# Patient Record
Sex: FEMALE | Race: OTHER | NOT HISPANIC OR LATINO | Employment: FULL TIME | URBAN - METROPOLITAN AREA
[De-identification: names, ages, dates, MRNs, and addresses within clinical notes are randomized per-mention and may not be internally consistent; named-entity substitution may affect disease eponyms.]

---

## 2023-02-22 LAB
ALANINE AMINOTRANSFERASE (SGPT) (U/L) IN SER/PLAS: 28 U/L (ref 7–45)
ALBUMIN (G/DL) IN SER/PLAS: 4.6 G/DL (ref 3.4–5)
ALKALINE PHOSPHATASE (U/L) IN SER/PLAS: 61 U/L (ref 33–110)
ANION GAP IN SER/PLAS: 15 MMOL/L (ref 10–20)
ASPARTATE AMINOTRANSFERASE (SGOT) (U/L) IN SER/PLAS: 22 U/L (ref 9–39)
BASOPHILS (10*3/UL) IN BLOOD BY AUTOMATED COUNT: 0.03 X10E9/L (ref 0–0.1)
BASOPHILS/100 LEUKOCYTES IN BLOOD BY AUTOMATED COUNT: 0.5 % (ref 0–2)
BILIRUBIN TOTAL (MG/DL) IN SER/PLAS: 0.5 MG/DL (ref 0–1.2)
CALCIUM (MG/DL) IN SER/PLAS: 9.9 MG/DL (ref 8.6–10.6)
CARBON DIOXIDE, TOTAL (MMOL/L) IN SER/PLAS: 26 MMOL/L (ref 21–32)
CHLORIDE (MMOL/L) IN SER/PLAS: 103 MMOL/L (ref 98–107)
CHOLESTEROL (MG/DL) IN SER/PLAS: 230 MG/DL (ref 0–199)
CHOLESTEROL IN HDL (MG/DL) IN SER/PLAS: 96.7 MG/DL
CHOLESTEROL/HDL RATIO: 2.4
CREATININE (MG/DL) IN SER/PLAS: 0.69 MG/DL (ref 0.5–1.05)
EOSINOPHILS (10*3/UL) IN BLOOD BY AUTOMATED COUNT: 0.07 X10E9/L (ref 0–0.7)
EOSINOPHILS/100 LEUKOCYTES IN BLOOD BY AUTOMATED COUNT: 1.2 % (ref 0–6)
ERYTHROCYTE DISTRIBUTION WIDTH (RATIO) BY AUTOMATED COUNT: 13.7 % (ref 11.5–14.5)
ERYTHROCYTE MEAN CORPUSCULAR HEMOGLOBIN CONCENTRATION (G/DL) BY AUTOMATED: 31.6 G/DL (ref 32–36)
ERYTHROCYTE MEAN CORPUSCULAR VOLUME (FL) BY AUTOMATED COUNT: 96 FL (ref 80–100)
ERYTHROCYTES (10*6/UL) IN BLOOD BY AUTOMATED COUNT: 4.12 X10E12/L (ref 4–5.2)
GFR FEMALE: >90 ML/MIN/1.73M2
GLUCOSE (MG/DL) IN SER/PLAS: 86 MG/DL (ref 74–99)
HEMATOCRIT (%) IN BLOOD BY AUTOMATED COUNT: 39.5 % (ref 36–46)
HEMOGLOBIN (G/DL) IN BLOOD: 12.5 G/DL (ref 12–16)
IMMATURE GRANULOCYTES/100 LEUKOCYTES IN BLOOD BY AUTOMATED COUNT: 0.2 % (ref 0–0.9)
LDL: 122 MG/DL (ref 0–99)
LEUKOCYTES (10*3/UL) IN BLOOD BY AUTOMATED COUNT: 5.7 X10E9/L (ref 4.4–11.3)
LYMPHOCYTES (10*3/UL) IN BLOOD BY AUTOMATED COUNT: 1.53 X10E9/L (ref 1.2–4.8)
LYMPHOCYTES/100 LEUKOCYTES IN BLOOD BY AUTOMATED COUNT: 27 % (ref 13–44)
MONOCYTES (10*3/UL) IN BLOOD BY AUTOMATED COUNT: 0.24 X10E9/L (ref 0.1–1)
MONOCYTES/100 LEUKOCYTES IN BLOOD BY AUTOMATED COUNT: 4.2 % (ref 2–10)
NEUTROPHILS (10*3/UL) IN BLOOD BY AUTOMATED COUNT: 3.78 X10E9/L (ref 1.2–7.7)
NEUTROPHILS/100 LEUKOCYTES IN BLOOD BY AUTOMATED COUNT: 66.9 % (ref 40–80)
NRBC (PER 100 WBCS) BY AUTOMATED COUNT: 0 /100 WBC (ref 0–0)
PLATELETS (10*3/UL) IN BLOOD AUTOMATED COUNT: 274 X10E9/L (ref 150–450)
POTASSIUM (MMOL/L) IN SER/PLAS: 4.1 MMOL/L (ref 3.5–5.3)
PROTEIN TOTAL: 6.8 G/DL (ref 6.4–8.2)
SODIUM (MMOL/L) IN SER/PLAS: 140 MMOL/L (ref 136–145)
TRIGLYCERIDE (MG/DL) IN SER/PLAS: 58 MG/DL (ref 0–149)
UREA NITROGEN (MG/DL) IN SER/PLAS: 18 MG/DL (ref 6–23)
VLDL: 12 MG/DL (ref 0–40)

## 2023-10-07 ENCOUNTER — ANCILLARY PROCEDURE (OUTPATIENT)
Dept: RADIOLOGY | Facility: CLINIC | Age: 53
End: 2023-10-07
Payer: COMMERCIAL

## 2023-10-07 DIAGNOSIS — R74.8 ABNORMAL LEVELS OF OTHER SERUM ENZYMES: ICD-10-CM

## 2023-10-07 DIAGNOSIS — Z79.631 LONG TERM (CURRENT) USE OF ANTIMETABOLITE AGENT: ICD-10-CM

## 2023-10-07 DIAGNOSIS — Z51.81 ENCOUNTER FOR THERAPEUTIC DRUG LEVEL MONITORING: ICD-10-CM

## 2023-10-07 PROCEDURE — 76705 ECHO EXAM OF ABDOMEN: CPT | Performed by: RADIOLOGY

## 2023-10-07 PROCEDURE — 76705 ECHO EXAM OF ABDOMEN: CPT

## 2023-10-31 ENCOUNTER — LAB (OUTPATIENT)
Dept: LAB | Facility: LAB | Age: 53
End: 2023-10-31
Payer: COMMERCIAL

## 2023-10-31 DIAGNOSIS — R94.5 ABNORMAL RESULTS OF LIVER FUNCTION STUDIES: Primary | ICD-10-CM

## 2023-10-31 PROCEDURE — 86038 ANTINUCLEAR ANTIBODIES: CPT

## 2023-10-31 PROCEDURE — 86015 ACTIN ANTIBODY EACH: CPT

## 2023-10-31 PROCEDURE — 36415 COLL VENOUS BLD VENIPUNCTURE: CPT

## 2023-11-01 LAB
ANA SER QL HEP2 SUBST: NEGATIVE
SMOOTH MUSCLE AB SER QL IF: NEGATIVE

## 2024-02-01 DIAGNOSIS — I10 ESSENTIAL (PRIMARY) HYPERTENSION: ICD-10-CM

## 2024-02-06 RX ORDER — HYDROCHLOROTHIAZIDE 12.5 MG/1
12.5 TABLET ORAL
Qty: 90 TABLET | Refills: 0 | Status: SHIPPED | OUTPATIENT
Start: 2024-02-06 | End: 2024-03-20 | Stop reason: SINTOL

## 2024-03-10 DIAGNOSIS — I10 ESSENTIAL (PRIMARY) HYPERTENSION: ICD-10-CM

## 2024-03-11 RX ORDER — AMLODIPINE BESYLATE 5 MG/1
5 TABLET ORAL DAILY
Qty: 90 TABLET | Refills: 0 | Status: SHIPPED | OUTPATIENT
Start: 2024-03-11 | End: 2024-03-20 | Stop reason: WASHOUT

## 2024-03-11 NOTE — TELEPHONE ENCOUNTER
Rx request received  Pharmacy populated  Last appt 6/21/23  Called patient and scheduled for follow up appmt

## 2024-03-13 DIAGNOSIS — R07.9 CHEST PAIN, UNSPECIFIED: Primary | ICD-10-CM

## 2024-03-20 ENCOUNTER — OFFICE VISIT (OUTPATIENT)
Dept: PRIMARY CARE | Facility: CLINIC | Age: 54
End: 2024-03-20
Payer: COMMERCIAL

## 2024-03-20 VITALS
SYSTOLIC BLOOD PRESSURE: 130 MMHG | BODY MASS INDEX: 35.52 KG/M2 | OXYGEN SATURATION: 99 % | DIASTOLIC BLOOD PRESSURE: 78 MMHG | TEMPERATURE: 96 F | HEIGHT: 66 IN | WEIGHT: 221 LBS | RESPIRATION RATE: 18 BRPM | HEART RATE: 87 BPM

## 2024-03-20 DIAGNOSIS — Z12.31 ENCOUNTER FOR SCREENING MAMMOGRAM FOR MALIGNANT NEOPLASM OF BREAST: ICD-10-CM

## 2024-03-20 DIAGNOSIS — R35.0 URINARY FREQUENCY: ICD-10-CM

## 2024-03-20 DIAGNOSIS — E55.9 VITAMIN D DEFICIENCY: ICD-10-CM

## 2024-03-20 DIAGNOSIS — Z00.00 ANNUAL PHYSICAL EXAM: Primary | ICD-10-CM

## 2024-03-20 DIAGNOSIS — I10 PRIMARY HYPERTENSION: ICD-10-CM

## 2024-03-20 PROBLEM — E78.5 HYPERLIPIDEMIA: Status: ACTIVE | Noted: 2024-03-20

## 2024-03-20 PROBLEM — K21.9 GASTROESOPHAGEAL REFLUX DISEASE WITHOUT ESOPHAGITIS: Status: ACTIVE | Noted: 2018-06-18

## 2024-03-20 PROBLEM — E27.8 LEFT ADRENAL MASS (MULTI): Status: ACTIVE | Noted: 2024-03-20

## 2024-03-20 PROBLEM — M33.13 DERMATOMYOSITIS (MULTI): Status: ACTIVE | Noted: 2024-03-20

## 2024-03-20 PROBLEM — L40.9 PSORIASIS: Status: ACTIVE | Noted: 2023-03-04

## 2024-03-20 PROBLEM — L90.0 LICHEN SCLEROSUS: Status: ACTIVE | Noted: 2024-03-20

## 2024-03-20 PROBLEM — R73.01 IMPAIRED FASTING GLUCOSE: Status: ACTIVE | Noted: 2017-02-16

## 2024-03-20 PROBLEM — F41.8 DEPRESSION WITH ANXIETY: Status: ACTIVE | Noted: 2024-03-20

## 2024-03-20 PROCEDURE — 3078F DIAST BP <80 MM HG: CPT | Performed by: FAMILY MEDICINE

## 2024-03-20 PROCEDURE — 99396 PREV VISIT EST AGE 40-64: CPT | Performed by: FAMILY MEDICINE

## 2024-03-20 PROCEDURE — 3075F SYST BP GE 130 - 139MM HG: CPT | Performed by: FAMILY MEDICINE

## 2024-03-20 PROCEDURE — 1036F TOBACCO NON-USER: CPT | Performed by: FAMILY MEDICINE

## 2024-03-20 PROCEDURE — 99213 OFFICE O/P EST LOW 20 MIN: CPT | Performed by: FAMILY MEDICINE

## 2024-03-20 RX ORDER — METHOTREXATE 2.5 MG/1
2.5 TABLET ORAL
COMMUNITY

## 2024-03-20 RX ORDER — TRIAMCINOLONE ACETONIDE 1 MG/G
CREAM TOPICAL
Status: ON HOLD | COMMUNITY
End: 2024-03-27 | Stop reason: WASHOUT

## 2024-03-20 RX ORDER — FOLIC ACID 1 MG/1
1 TABLET ORAL DAILY
COMMUNITY

## 2024-03-20 RX ORDER — ALBUTEROL SULFATE 90 UG/1
2 AEROSOL, METERED RESPIRATORY (INHALATION) EVERY 4 HOURS PRN
COMMUNITY

## 2024-03-20 RX ORDER — LORAZEPAM 0.5 MG/1
0.5 TABLET ORAL
COMMUNITY
Start: 2013-06-04

## 2024-03-20 RX ORDER — METFORMIN HYDROCHLORIDE 500 MG/1
500 TABLET, EXTENDED RELEASE ORAL
COMMUNITY
Start: 2020-03-13

## 2024-03-20 RX ORDER — CLOBETASOL PROPIONATE 0.5 MG/G
1 OINTMENT TOPICAL 2 TIMES DAILY
COMMUNITY
Start: 2017-02-15

## 2024-03-20 RX ORDER — DEXMETHYLPHENIDATE HYDROCHLORIDE 15 MG/1
15 CAPSULE, EXTENDED RELEASE ORAL DAILY
COMMUNITY
Start: 2024-02-21

## 2024-03-20 RX ORDER — ISOSORBIDE MONONITRATE 30 MG/1
30 TABLET, EXTENDED RELEASE ORAL DAILY
Status: ON HOLD | COMMUNITY
Start: 2024-03-13 | End: 2024-03-27 | Stop reason: ALTCHOICE

## 2024-03-20 RX ORDER — ARIPIPRAZOLE 5 MG/1
5 TABLET ORAL DAILY
COMMUNITY

## 2024-03-20 RX ORDER — AMLODIPINE AND VALSARTAN 5; 160 MG/1; MG/1
1 TABLET ORAL DAILY
Qty: 30 TABLET | Refills: 11 | Status: SHIPPED | OUTPATIENT
Start: 2024-03-20 | End: 2025-03-20

## 2024-03-20 RX ORDER — ESCITALOPRAM OXALATE 20 MG/1
20 TABLET ORAL DAILY
COMMUNITY

## 2024-03-20 RX ORDER — ASCORBIC ACID 500 MG
500 TABLET ORAL
COMMUNITY
Start: 2011-12-07

## 2024-03-20 RX ORDER — TRIAMCINOLONE ACETONIDE 0.25 MG/G
1 CREAM TOPICAL
Status: ON HOLD | COMMUNITY
End: 2024-03-27 | Stop reason: WASHOUT

## 2024-03-20 RX ORDER — HYDROCORTISONE 25 MG/G
CREAM TOPICAL 2 TIMES DAILY
COMMUNITY
Start: 2018-06-26

## 2024-03-20 RX ORDER — MULTIVITAMIN
1 TABLET ORAL DAILY
COMMUNITY
Start: 2005-05-04

## 2024-03-20 RX ORDER — IBUPROFEN 200 MG
200 TABLET ORAL EVERY 6 HOURS
COMMUNITY

## 2024-03-20 ASSESSMENT — LIFESTYLE VARIABLES
HOW MANY STANDARD DRINKS CONTAINING ALCOHOL DO YOU HAVE ON A TYPICAL DAY: 1 OR 2
SKIP TO QUESTIONS 9-10: 1
HOW OFTEN DO YOU HAVE A DRINK CONTAINING ALCOHOL: MONTHLY OR LESS
HOW OFTEN DO YOU HAVE SIX OR MORE DRINKS ON ONE OCCASION: NEVER
AUDIT-C TOTAL SCORE: 1

## 2024-03-20 ASSESSMENT — PATIENT HEALTH QUESTIONNAIRE - PHQ9
6. FEELING BAD ABOUT YOURSELF - OR THAT YOU ARE A FAILURE OR HAVE LET YOURSELF OR YOUR FAMILY DOWN: NOT AT ALL
4. FEELING TIRED OR HAVING LITTLE ENERGY: MORE THAN HALF THE DAYS
8. MOVING OR SPEAKING SO SLOWLY THAT OTHER PEOPLE COULD HAVE NOTICED. OR THE OPPOSITE, BEING SO FIGETY OR RESTLESS THAT YOU HAVE BEEN MOVING AROUND A LOT MORE THAN USUAL: NOT AT ALL
2. FEELING DOWN, DEPRESSED OR HOPELESS: NEARLY EVERY DAY
3. TROUBLE FALLING OR STAYING ASLEEP OR SLEEPING TOO MUCH: SEVERAL DAYS
9. THOUGHTS THAT YOU WOULD BE BETTER OFF DEAD, OR OF HURTING YOURSELF: NOT AT ALL
10. IF YOU CHECKED OFF ANY PROBLEMS, HOW DIFFICULT HAVE THESE PROBLEMS MADE IT FOR YOU TO DO YOUR WORK, TAKE CARE OF THINGS AT HOME, OR GET ALONG WITH OTHER PEOPLE: NOT DIFFICULT AT ALL
7. TROUBLE CONCENTRATING ON THINGS, SUCH AS READING THE NEWSPAPER OR WATCHING TELEVISION: NEARLY EVERY DAY
5. POOR APPETITE OR OVEREATING: NEARLY EVERY DAY
SUM OF ALL RESPONSES TO PHQ9 QUESTIONS 1 AND 2: 6
SUM OF ALL RESPONSES TO PHQ QUESTIONS 1-9: 15
1. LITTLE INTEREST OR PLEASURE IN DOING THINGS: NEARLY EVERY DAY

## 2024-03-20 ASSESSMENT — PAIN SCALES - GENERAL: PAINLEVEL: 6

## 2024-03-20 NOTE — PROGRESS NOTES
History Of Present Illness  Vidya Landry is a 53 y.o. female presenting for MED FOLLOW UP APPMT (MED FOLLOW UP)  .    HPI     Here for routine physical.  She is up-to-date with her mammogram until May 2024.  Pap bup-to-date until 2027.  Also seeing dermatology once a year.she sees Dr. Ballard for GI and is up-to-date with colon cancer screening.    Seeing [facility]. Scheduled for stress test on Monday.     HTN - not checking at home. At work blood pressure is 130s over 80s.  Asymptomatic with amlodipine and hydrochlorothiazide.      She complains of urinary frequency and urgency somewhat so that she intends to see urogynecology.  She drinks about 2-3 caffeinated beverages a day.    Past Medical History  Patient Active Problem List    Diagnosis Date Noted    Alcohol abuse 03/26/2024    Atherosclerosis of coronary artery 03/26/2024    Bursitis of hip 03/26/2024    Disorder of connective tissue (CMS/HCC) 03/26/2024    History of colonic polyps 03/26/2024    Irregular menstrual cycle 03/26/2024    Long term methotrexate user 03/26/2024    Moderate recurrent major depression (CMS/HCC) 03/26/2024    Premenstrual tension syndrome 03/26/2024    Psoriasis with arthropathy (CMS/HCC) 03/26/2024    Rash 03/26/2024    Recurrent major depressive disorder, in remission (CMS/HCC) 03/26/2024    Mixed anxiety depressive disorder 03/26/2024    Hypertrophy of bone, unspecified shoulder 03/26/2024    Lichen sclerosus et atrophicus 03/26/2024    Long term current use of immunosuppressive drug 03/26/2024    Muscle spasm of back 03/26/2024    Dermatomyositis (CMS/HCC) 03/20/2024    MARK (generalized anxiety disorder) 03/20/2024    Mass of left adrenal gland (CMS/HCC) 03/20/2024    Lichen sclerosus 03/20/2024    Long term (current) use of antimetabolite agent 10/07/2023    Abnormal levels of other serum enzymes 10/07/2023    Encounter for therapeutic drug level monitoring 10/07/2023    Other specified noninfective disorders of lymphatic  vessels and lymph nodes 05/03/2023    Psoriasis 03/04/2023    Intrinsic (allergic) eczema 03/04/2023    Other long term (current) drug therapy 03/04/2023    Localized enlarged lymph nodes 02/16/2023    Gastroesophageal reflux disease without esophagitis 06/18/2018    Primary hypertension 06/18/2018    Daytime sleepiness 04/02/2018    Fatigue 04/02/2018    Pre-diabetes 04/02/2018    Impaired fasting glucose 02/16/2017    Presbyopia of both eyes 05/11/2016    Hyperopia of both eyes with astigmatism 05/11/2016    Obesity 11/19/2015    Depressive disorder 06/04/2013    Obstructive sleep apnea (adult) (pediatric) 05/23/2011    Parasomnia in conditions classified elsewhere 08/22/2008    Seborrheic dermatitis 08/05/2008    Hyperlipidemia 02/03/2006    Polycystic ovaries 11/08/2002    Allergic rhinitis 09/03/2002        Medications  Current Outpatient Medications on File Prior to Visit   Medication Sig    albuterol 90 mcg/actuation inhaler Inhale 2 puffs every 4 hours if needed for wheezing or shortness of breath.    amLODIPine (Norvasc) 5 mg tablet TAKE 1 TABLET BY MOUTH EVERY DAY FOR 90 DAYS    ARIPiprazole (Abilify) 5 mg tablet Take 1 tablet (5 mg) by mouth once daily.    ascorbic acid (Vitamin C) 500 mg tablet Take 1 tablet (500 mg) by mouth.    clobetasol (Temovate) 0.05 % ointment Apply 1 Application topically 2 times a day.    dexmethylphenidate XR (Focalin XR) 15 mg 24 hr capsule Take 1 capsule (15 mg) by mouth once daily.    escitalopram (Lexapro) 20 mg tablet Take 1 tablet (20 mg) by mouth once daily.    folic acid (Folvite) 1 mg tablet Take 1 tablet (1 mg) by mouth once daily.    hydroCHLOROthiazide (HYDRODiuril) 12.5 mg tablet TAKE 1 TABLET BY MOUTH EVERY DAY IN THE MORNING    hydrocortisone 2.5 % cream Apply topically 2 times a day.    ibuprofen 200 mg tablet Take 1 tablet (200 mg) by mouth every 6 hours.    isosorbide mononitrate ER (Imdur) 30 mg 24 hr tablet Take 1 tablet (30 mg) by mouth once daily.     LORazepam (Ativan) 0.5 mg tablet Take 1 tablet (0.5 mg) by mouth.    metFORMIN  mg 24 hr tablet Take 1 tablet (500 mg) by mouth once daily in the evening. Take with meals.    methotrexate (Trexall) 2.5 mg tablet Take 1 tablet (2.5 mg total) by mouth 1 (one) time per week.    multivitamin tablet Take 1 tablet by mouth once daily.    triamcinolone (Kenalog) 0.025 % cream 1 Application.    triamcinolone (Kenalog) 0.1 % cream 1 application to affected area Externally Twice a day as needed for active rash on body, not face, use sporadically contains steroid for 30 days     No current facility-administered medications on file prior to visit.        Surgical History  She has a past surgical history that includes Other surgical history (01/02/2020).     Social History  She reports that she quit smoking about 7 years ago. Her smoking use included cigarettes. She started smoking about 30 years ago. She has a 23.00 pack-year smoking history. She has never been exposed to tobacco smoke. She has never used smokeless tobacco. She reports current alcohol use. She reports current drug use. Drug: Marijuana.    Family History  Family History   Problem Relation Name Age of Onset    Hypertension Mother      Diabetes Mother      Psoriasis Mother      Diabetes Father      Kidney failure Father          Allergies  Amoxicillin-pot clavulanate, Balsam peru, Cobalt, and Dermatitis antigens    Immunizations  Immunization History   Administered Date(s) Administered    Flu vaccine (IIV4), preservative free *Check age/dose* 09/28/2016, 09/20/2017, 09/12/2018, 09/05/2020, 09/11/2021    Flu vaccine, quadrivalent, no egg protein, age 6 month or greater (FLUCELVAX) 09/14/2019, 09/22/2023    Flu vaccine, quadrivalent, recombinant, preservative free, adult (FLUBLOK) 08/26/2022    Hepatitis A vaccine, age 19 years and greater (HAVRIX) 06/15/2016, 12/16/2016    Hepatitis B vaccine, adult (RECOMBIVAX, ENGERIX) 06/15/2016, 07/13/2016, 10/12/2016     "Influenza, Unspecified 11/08/2002, 10/21/2003, 10/26/2011    Influenza, injectable, quadrivalent 09/12/2018, 10/01/2019, 10/12/2020, 09/11/2021    Influenza, seasonal, injectable 11/16/2000, 11/09/2001, 10/01/2015, 10/13/2016, 10/23/2018, 10/09/2019, 10/13/2023    Influenza, seasonal, injectable, preservative free 10/01/2015    Meningococcal ACWY vaccine (MENVEO) 07/13/2016    Novel influenza-H1N1-09, preservative-free 12/05/2009    Pfizer COVID-19 vaccine, Fall 2023, 12 years and older, (30mcg/0.3mL) 09/22/2023    Pfizer COVID-19 vaccine, bivalent, age 12 years and older (30 mcg/0.3 mL) 09/16/2022, 04/22/2023    Pfizer Purple Cap SARS-CoV-2 03/26/2021, 04/23/2021, 08/14/2021, 02/12/2022    Pneumococcal polysaccharide vaccine, 23-valent, age 2 years and older (PNEUMOVAX 23) 09/14/2019    Tdap vaccine, age 7 year and older (BOOSTRIX, ADACEL) 03/01/2010, 06/02/2020    Typhoid, ViCPs 12/16/2016    Zoster vaccine, recombinant, adult (SHINGRIX) 07/31/2021, 10/02/2021        ROS  Negative, except as discussed in HPI     Vitals  /78   Pulse 87   Temp 35.6 °C (96 °F)   Resp 18   Ht 1.676 m (5' 6\")   Wt 100 kg (221 lb)   SpO2 99%   BMI 35.67 kg/m²      Physical Exam  Vitals and nursing note reviewed.   Constitutional:       Appearance: Normal appearance.   HENT:      Head: Normocephalic.      Right Ear: Tympanic membrane normal.      Left Ear: Tympanic membrane normal.      Nose: Nose normal.      Mouth/Throat:      Mouth: Mucous membranes are moist.   Eyes:      Extraocular Movements: Extraocular movements intact.      Conjunctiva/sclera: Conjunctivae normal.      Pupils: Pupils are equal, round, and reactive to light.   Cardiovascular:      Rate and Rhythm: Normal rate and regular rhythm.      Heart sounds: Normal heart sounds.   Pulmonary:      Effort: Pulmonary effort is normal. No respiratory distress.      Breath sounds: Normal breath sounds.   Abdominal:      General: Abdomen is flat.      Palpations: " Abdomen is soft.      Tenderness: There is no abdominal tenderness.   Musculoskeletal:      Cervical back: Neck supple.   Lymphadenopathy:      Cervical: No cervical adenopathy.   Skin:     General: Skin is warm and dry.      Findings: No rash.   Neurological:      General: No focal deficit present.      Mental Status: She is alert. Mental status is at baseline.      Coordination: Coordination normal.      Gait: Gait normal.      Deep Tendon Reflexes: Reflexes normal.   Psychiatric:         Mood and Affect: Mood normal.         Behavior: Behavior normal.         Relevant Results  Lab Results   Component Value Date    WBC 8.7 03/25/2024    WBC 6.6 03/04/2023    HGB 13.1 03/25/2024    HGB 13.4 03/04/2023    HCT 39.9 03/25/2024    HCT 39.5 03/04/2023    MCV 93 03/25/2024    MCV 94.0 03/04/2023     03/25/2024     03/04/2023     Lab Results   Component Value Date     03/25/2024     03/04/2023    K 4.5 03/25/2024    K 4.5 03/04/2023     03/25/2024    CL 99 03/04/2023    CO2 25 03/25/2024    CO2 25 03/04/2023    BUN 12 03/25/2024    BUN 20 03/04/2023    CREATININE 0.70 03/25/2024    CREATININE 0.7 03/04/2023    CALCIUM 10.1 03/25/2024    CALCIUM 10.3 03/04/2023    PROT 6.8 03/25/2024    PROT 7.5 03/04/2023    BILITOT 0.3 03/25/2024    BILITOT 0.2 03/04/2023    ALKPHOS 87 03/25/2024    ALKPHOS 74 03/04/2023    ALT 20 03/25/2024    ALT 38 03/04/2023    AST 19 03/25/2024    AST 25 03/04/2023    GLUCOSE 93 03/25/2024    GLUCOSE 95 03/04/2023     Lab Results   Component Value Date    HGBA1C 5.2 03/25/2024     Lab Results   Component Value Date    TSH 1.92 03/25/2024    FREET4 1.5 01/03/2022      Lab Results   Component Value Date    CHOL 226 (H) 03/25/2024    TRIG 149 03/25/2024    HDL 83.0 03/25/2024           Assessment/Plan   Vidya was seen today for med follow up appmt.  Diagnoses and all orders for this visit:  Annual physical exam (Primary)  -     Comprehensive Metabolic Panel; Future  -      TSH with reflex to Free T4 if abnormal; Future  -     CBC; Future  -     Hemoglobin A1C; Future  Primary hypertension  Comments:  At goal but will discontinue hydrochlorothiazide due to urinary frequency  Orders:  -     amlodipine-valsartan (Exforge) 5-160 mg tablet; Take 1 tablet by mouth once daily.  -     Follow Up In Primary Care - Established; Future  Vitamin D deficiency  -     Vitamin D 25-Hydroxy,Total (for eval of Vitamin D levels); Future  Encounter for screening mammogram for malignant neoplasm of breast  -     BI mammo bilateral screening tomosynthesis; Future  Urinary frequency  Comments:  Discussed reducing bladder irritants including carbonated drinks, bladder exercises.  If no improvement consider medication if she no appt with urogyn by then       Medications Discontinued During This Encounter   Medication Reason    hydroCHLOROthiazide (HYDRODiuril) 12.5 mg tablet Side effects    amLODIPine (Norvasc) 5 mg tablet Med List Cleanup        Counseling:   Medication education:   -Education:  The patient is counseled regarding potential side-effects of any and all new medications  -Understanding:  Patient expressed understanding of information discussed today  -Adherence:  No barriers to adherence identified    Final treatment plan is a result of shared decision making with patient.         Cale Chi MD

## 2024-03-20 NOTE — PATIENT INSTRUCTIONS
Cut back on caffeine/carbonated drinks  We changed amlodipine and hydrochlorothiazide to a combination pill amlodipine/valsartan once daily

## 2024-03-25 ENCOUNTER — HOSPITAL ENCOUNTER (OUTPATIENT)
Dept: RADIOLOGY | Facility: HOSPITAL | Age: 54
Discharge: HOME | DRG: 322 | End: 2024-03-25
Payer: COMMERCIAL

## 2024-03-25 ENCOUNTER — LAB (OUTPATIENT)
Dept: LAB | Facility: LAB | Age: 54
End: 2024-03-25
Payer: COMMERCIAL

## 2024-03-25 ENCOUNTER — HOSPITAL ENCOUNTER (OUTPATIENT)
Dept: CARDIOLOGY | Facility: HOSPITAL | Age: 54
Discharge: HOME | DRG: 322 | End: 2024-03-25
Payer: COMMERCIAL

## 2024-03-25 DIAGNOSIS — R07.9 CHEST PAIN, UNSPECIFIED: ICD-10-CM

## 2024-03-25 DIAGNOSIS — E55.9 VITAMIN D DEFICIENCY: ICD-10-CM

## 2024-03-25 DIAGNOSIS — Z00.00 ANNUAL PHYSICAL EXAM: ICD-10-CM

## 2024-03-25 LAB
25(OH)D3 SERPL-MCNC: 78 NG/ML (ref 31–100)
ALBUMIN SERPL-MCNC: 4.5 G/DL (ref 3.5–5)
ALP BLD-CCNC: 87 U/L (ref 35–125)
ALT SERPL-CCNC: 20 U/L (ref 5–40)
ANION GAP SERPL CALC-SCNC: 14 MMOL/L
AST SERPL-CCNC: 19 U/L (ref 5–40)
BILIRUB SERPL-MCNC: 0.3 MG/DL (ref 0.1–1.2)
BUN SERPL-MCNC: 12 MG/DL (ref 8–25)
CALCIUM SERPL-MCNC: 10.1 MG/DL (ref 8.5–10.4)
CHLORIDE SERPL-SCNC: 103 MMOL/L (ref 97–107)
CHOLEST SERPL-MCNC: 226 MG/DL (ref 133–200)
CHOLEST/HDLC SERPL: 2.7 {RATIO}
CO2 SERPL-SCNC: 25 MMOL/L (ref 24–31)
CREAT SERPL-MCNC: 0.7 MG/DL (ref 0.4–1.6)
EGFRCR SERPLBLD CKD-EPI 2021: >90 ML/MIN/1.73M*2
ERYTHROCYTE [DISTWIDTH] IN BLOOD BY AUTOMATED COUNT: 13.5 % (ref 11.5–14.5)
EST. AVERAGE GLUCOSE BLD GHB EST-MCNC: 103 MG/DL
GLUCOSE SERPL-MCNC: 93 MG/DL (ref 65–99)
HBA1C MFR BLD: 5.2 %
HCT VFR BLD AUTO: 39.9 % (ref 36–46)
HDLC SERPL-MCNC: 83 MG/DL
HGB BLD-MCNC: 13.1 G/DL (ref 12–16)
LDLC SERPL CALC-MCNC: 113 MG/DL (ref 65–130)
MCH RBC QN AUTO: 30.6 PG (ref 26–34)
MCHC RBC AUTO-ENTMCNC: 32.8 G/DL (ref 32–36)
MCV RBC AUTO: 93 FL (ref 80–100)
NRBC BLD-RTO: 0 /100 WBCS (ref 0–0)
PLATELET # BLD AUTO: 267 X10*3/UL (ref 150–450)
POTASSIUM SERPL-SCNC: 4.5 MMOL/L (ref 3.4–5.1)
PROT SERPL-MCNC: 6.8 G/DL (ref 5.9–7.9)
RBC # BLD AUTO: 4.28 X10*6/UL (ref 4–5.2)
SODIUM SERPL-SCNC: 142 MMOL/L (ref 133–145)
TRIGL SERPL-MCNC: 149 MG/DL (ref 40–150)
TSH SERPL DL<=0.05 MIU/L-ACNC: 1.92 MIU/L (ref 0.27–4.2)
WBC # BLD AUTO: 8.7 X10*3/UL (ref 4.4–11.3)

## 2024-03-25 PROCEDURE — 82306 VITAMIN D 25 HYDROXY: CPT

## 2024-03-25 PROCEDURE — A9502 TC99M TETROFOSMIN: HCPCS | Performed by: INTERNAL MEDICINE

## 2024-03-25 PROCEDURE — 3430000001 HC RX 343 DIAGNOSTIC RADIOPHARMACEUTICALS: Performed by: INTERNAL MEDICINE

## 2024-03-25 PROCEDURE — 85027 COMPLETE CBC AUTOMATED: CPT

## 2024-03-25 PROCEDURE — 80061 LIPID PANEL: CPT

## 2024-03-25 PROCEDURE — 80053 COMPREHEN METABOLIC PANEL: CPT

## 2024-03-25 PROCEDURE — 93017 CV STRESS TEST TRACING ONLY: CPT

## 2024-03-25 PROCEDURE — 36415 COLL VENOUS BLD VENIPUNCTURE: CPT

## 2024-03-25 PROCEDURE — 78452 HT MUSCLE IMAGE SPECT MULT: CPT

## 2024-03-25 PROCEDURE — 78452 HT MUSCLE IMAGE SPECT MULT: CPT | Performed by: STUDENT IN AN ORGANIZED HEALTH CARE EDUCATION/TRAINING PROGRAM

## 2024-03-25 PROCEDURE — 83036 HEMOGLOBIN GLYCOSYLATED A1C: CPT

## 2024-03-25 PROCEDURE — 84443 ASSAY THYROID STIM HORMONE: CPT

## 2024-03-25 RX ADMIN — TETROFOSMIN 11.4 MILLICURIE: 0.23 INJECTION, POWDER, LYOPHILIZED, FOR SOLUTION INTRAVENOUS at 08:45

## 2024-03-25 RX ADMIN — TETROFOSMIN 36 MILLICURIE: 0.23 INJECTION, POWDER, LYOPHILIZED, FOR SOLUTION INTRAVENOUS at 10:35

## 2024-03-26 ENCOUNTER — OFFICE VISIT (OUTPATIENT)
Dept: SURGERY | Facility: CLINIC | Age: 54
End: 2024-03-26
Payer: COMMERCIAL

## 2024-03-26 VITALS
DIASTOLIC BLOOD PRESSURE: 76 MMHG | WEIGHT: 222 LBS | OXYGEN SATURATION: 98 % | SYSTOLIC BLOOD PRESSURE: 134 MMHG | BODY MASS INDEX: 35.68 KG/M2 | HEART RATE: 81 BPM | HEIGHT: 66 IN

## 2024-03-26 DIAGNOSIS — N63.21 MASS OF UPPER OUTER QUADRANT OF LEFT BREAST: Primary | ICD-10-CM

## 2024-03-26 PROBLEM — R74.8 ABNORMAL LEVELS OF OTHER SERUM ENZYMES: Status: ACTIVE | Noted: 2023-10-07

## 2024-03-26 PROBLEM — Z79.899 OTHER LONG TERM (CURRENT) DRUG THERAPY: Status: ACTIVE | Noted: 2023-03-04

## 2024-03-26 PROBLEM — Z79.631 LONG TERM METHOTREXATE USER: Status: ACTIVE | Noted: 2024-03-26

## 2024-03-26 PROBLEM — L40.50 PSORIASIS WITH ARTHROPATHY (MULTI): Status: ACTIVE | Noted: 2024-03-26

## 2024-03-26 PROBLEM — F33.1 MODERATE RECURRENT MAJOR DEPRESSION (MULTI): Status: ACTIVE | Noted: 2024-03-26

## 2024-03-26 PROBLEM — L20.84 INTRINSIC (ALLERGIC) ECZEMA: Status: ACTIVE | Noted: 2023-03-04

## 2024-03-26 PROBLEM — L90.0 LICHEN SCLEROSUS ET ATROPHICUS: Status: ACTIVE | Noted: 2024-03-26

## 2024-03-26 PROBLEM — F41.1 GAD (GENERALIZED ANXIETY DISORDER): Status: ACTIVE | Noted: 2024-03-20

## 2024-03-26 PROBLEM — M62.830 MUSCLE SPASM OF BACK: Status: ACTIVE | Noted: 2024-03-26

## 2024-03-26 PROBLEM — Z79.899 LONG TERM CURRENT USE OF IMMUNOSUPPRESSIVE DRUG: Status: ACTIVE | Noted: 2024-03-26

## 2024-03-26 PROBLEM — F33.40 RECURRENT MAJOR DEPRESSIVE DISORDER, IN REMISSION (CMS-HCC): Status: ACTIVE | Noted: 2024-03-26

## 2024-03-26 PROBLEM — N94.3 PREMENSTRUAL TENSION SYNDROME: Status: ACTIVE | Noted: 2024-03-26

## 2024-03-26 PROBLEM — M70.70 BURSITIS OF HIP: Status: ACTIVE | Noted: 2024-03-26

## 2024-03-26 PROBLEM — Z86.0100 HISTORY OF COLONIC POLYPS: Status: ACTIVE | Noted: 2024-03-26

## 2024-03-26 PROBLEM — F10.10 ALCOHOL ABUSE: Status: ACTIVE | Noted: 2024-03-26

## 2024-03-26 PROBLEM — M35.9 DISORDER OF CONNECTIVE TISSUE (MULTI): Status: ACTIVE | Noted: 2024-03-26

## 2024-03-26 PROBLEM — M89.319: Status: ACTIVE | Noted: 2024-03-26

## 2024-03-26 PROBLEM — R53.83 FATIGUE: Status: ACTIVE | Noted: 2018-04-02

## 2024-03-26 PROBLEM — N92.6 IRREGULAR MENSTRUAL CYCLE: Status: ACTIVE | Noted: 2024-03-26

## 2024-03-26 PROBLEM — R21 RASH: Status: ACTIVE | Noted: 2024-03-26

## 2024-03-26 PROBLEM — Z79.631 LONG TERM (CURRENT) USE OF ANTIMETABOLITE AGENT: Status: ACTIVE | Noted: 2023-10-07

## 2024-03-26 PROBLEM — R40.0 DAYTIME SLEEPINESS: Status: ACTIVE | Noted: 2018-04-02

## 2024-03-26 PROBLEM — R73.03 PRE-DIABETES: Status: ACTIVE | Noted: 2018-04-02

## 2024-03-26 PROBLEM — Z51.81 ENCOUNTER FOR THERAPEUTIC DRUG LEVEL MONITORING: Status: ACTIVE | Noted: 2023-10-07

## 2024-03-26 PROBLEM — I25.10 ATHEROSCLEROSIS OF CORONARY ARTERY: Status: ACTIVE | Noted: 2024-03-26

## 2024-03-26 PROBLEM — R59.0 LOCALIZED ENLARGED LYMPH NODES: Status: ACTIVE | Noted: 2023-02-16

## 2024-03-26 PROBLEM — I89.8 OTHER SPECIFIED NONINFECTIVE DISORDERS OF LYMPHATIC VESSELS AND LYMPH NODES: Status: ACTIVE | Noted: 2023-05-03

## 2024-03-26 PROBLEM — F41.8 MIXED ANXIETY DEPRESSIVE DISORDER: Status: ACTIVE | Noted: 2024-03-26

## 2024-03-26 PROBLEM — Z86.010 HISTORY OF COLONIC POLYPS: Status: ACTIVE | Noted: 2024-03-26

## 2024-03-26 PROCEDURE — 99204 OFFICE O/P NEW MOD 45 MIN: CPT | Performed by: STUDENT IN AN ORGANIZED HEALTH CARE EDUCATION/TRAINING PROGRAM

## 2024-03-26 PROCEDURE — 99214 OFFICE O/P EST MOD 30 MIN: CPT | Performed by: STUDENT IN AN ORGANIZED HEALTH CARE EDUCATION/TRAINING PROGRAM

## 2024-03-26 PROCEDURE — 3078F DIAST BP <80 MM HG: CPT | Performed by: STUDENT IN AN ORGANIZED HEALTH CARE EDUCATION/TRAINING PROGRAM

## 2024-03-26 PROCEDURE — 3075F SYST BP GE 130 - 139MM HG: CPT | Performed by: STUDENT IN AN ORGANIZED HEALTH CARE EDUCATION/TRAINING PROGRAM

## 2024-03-26 PROCEDURE — 1036F TOBACCO NON-USER: CPT | Performed by: STUDENT IN AN ORGANIZED HEALTH CARE EDUCATION/TRAINING PROGRAM

## 2024-03-26 RX ORDER — ASPIRIN 81 MG/1
81 TABLET ORAL DAILY
COMMUNITY

## 2024-03-26 ASSESSMENT — ENCOUNTER SYMPTOMS
SPEECH DIFFICULTY: 0
DYSURIA: 0
SHORTNESS OF BREATH: 0
HEADACHES: 0
CHILLS: 0
CHEST TIGHTNESS: 0
BLOOD IN STOOL: 0
HEMATURIA: 0
ADENOPATHY: 0
BRUISES/BLEEDS EASILY: 0
VOMITING: 0
FEVER: 0
SORE THROAT: 0
PALPITATIONS: 0
ARTHRALGIAS: 0
TROUBLE SWALLOWING: 0
WOUND: 0
VOICE CHANGE: 0
FACIAL ASYMMETRY: 0
ABDOMINAL PAIN: 0
UNEXPECTED WEIGHT CHANGE: 0
NAUSEA: 0
DIARRHEA: 0

## 2024-03-26 ASSESSMENT — PATIENT HEALTH QUESTIONNAIRE - PHQ9
2. FEELING DOWN, DEPRESSED OR HOPELESS: NOT AT ALL
SUM OF ALL RESPONSES TO PHQ9 QUESTIONS 1 AND 2: 0
1. LITTLE INTEREST OR PLEASURE IN DOING THINGS: NOT AT ALL

## 2024-03-26 ASSESSMENT — PAIN SCALES - GENERAL: PAINLEVEL: 0-NO PAIN

## 2024-03-26 NOTE — LETTER
March 26, 2024     Patient: Vidya Landry   YOB: 1970   Date of Visit: 3/26/2024       To Whom It May Concern:    Vidya Landry  was seen in my office today .  She will need to remain out of work until medically cleared.    If you have any questions or concerns, please don't hesitate to call [phone].         Sincerely,        Ericka Delaney MD    CC: No Recipients

## 2024-03-26 NOTE — PROGRESS NOTES
History Of Present Illness  Vidya Landry is a 53 y.o. female presenting for urgent evaluation of a left breast mass.  She was scheduled for a nuclear medicine stress test with Dr. Chou yesterday and was found to have a reversible perfusion defect and recommendation was for her to undergo a cardiac cath with likely stent placement.  This is planned for tomorrow.  On the CT imaging, she was noted to have a left breast mass and Dr. Chou referred her to me for evaluation before a stent was placed. she has gotten yearly mammograms for several years.  She does not do self breast exams and has not noticed any change in her breast over the past year.  No family history of breast or other Gyn cancer     Past Medical History  History reviewed. No pertinent past medical history.    Surgical History  Past Surgical History:   Procedure Laterality Date    OTHER SURGICAL HISTORY  01/02/2020    [place] tooth extraction        Social History  She reports that she quit smoking about 7 years ago. Her smoking use included cigarettes. She started smoking about 30 years ago. She has a 23.00 pack-year smoking history. She has never been exposed to tobacco smoke. She has never used smokeless tobacco. She reports current alcohol use. She reports current drug use. Drug: Marijuana.    Family History  Family History   Problem Relation Name Age of Onset    Hypertension Mother      Diabetes Mother      Psoriasis Mother      Diabetes Father      Kidney failure Father          Allergies  Amoxicillin-pot clavulanate, Balsam peru, Cobalt, and Dermatitis antigens    Review of Systems   Constitutional:  Negative for chills, fever and unexpected weight change.   HENT:  Negative for sneezing, sore throat, trouble swallowing and voice change.    Respiratory:  Negative for chest tightness and shortness of breath.    Cardiovascular:  Negative for chest pain and palpitations.   Gastrointestinal:  Negative for abdominal pain, blood in stool,  "diarrhea, nausea and vomiting.   Endocrine: Negative for cold intolerance and heat intolerance.   Genitourinary:  Negative for decreased urine volume, dysuria and hematuria.   Musculoskeletal:  Negative for arthralgias and gait problem.   Skin:  Negative for rash and wound.   Neurological:  Negative for facial asymmetry, speech difficulty and headaches.   Hematological:  Negative for adenopathy. Does not bruise/bleed easily.   Psychiatric/Behavioral:  Negative for self-injury and suicidal ideas.         Physical Exam  Vitals and nursing note reviewed. Exam conducted with a chaperone present.   Constitutional:       Appearance: Normal appearance.   HENT:      Head: Normocephalic and atraumatic.      Mouth/Throat:      Mouth: Mucous membranes are moist.      Pharynx: Oropharynx is clear.   Eyes:      Extraocular Movements: Extraocular movements intact.      Pupils: Pupils are equal, round, and reactive to light.   Cardiovascular:      Rate and Rhythm: Normal rate and regular rhythm.      Pulses: Normal pulses.   Pulmonary:      Effort: Pulmonary effort is normal.      Breath sounds: Normal breath sounds.   Chest:       Abdominal:      General: There is no distension.      Palpations: Abdomen is soft.      Tenderness: There is no abdominal tenderness.   Musculoskeletal:      Cervical back: Normal range of motion and neck supple.   Skin:     General: Skin is warm and dry.   Neurological:      General: No focal deficit present.      Mental Status: She is alert and oriented to person, place, and time.   Psychiatric:         Mood and Affect: Mood normal.         Behavior: Behavior normal.          Last Recorded Vitals  Blood pressure 134/76, pulse 81, height 1.676 m (5' 6\"), weight 101 kg (222 lb), SpO2 98 %.    Relevant Results   reviewed mammograms [id number] which are available in the system.  All demonstrate benign-appearing fibroglandular tissue with asymmetry in the left upper outer quadrant of the breast, which has " not significantly changed over many years   I have also reviewed the CT scan from the nuclear medicine test yesterday, which shows a left breast abnormality in the upper outer quadrant which likely correlates with the previous mammograms     Assessment/Plan   Problem List Items Addressed This Visit    None  Visit Diagnoses         Codes    Mass of upper outer quadrant of left breast    -  Primary N63.21    Relevant Orders    Referral to Breast Surgery           53-year-old female status post cardiac stress test yesterday which found reversible perfusion defects necessitating a heart catheterization and stent placement.  She was also found to have a left breast mass on the imaging, and this likely correlates with the previous finding seen on mammogram which had a benign appearance and have not significantly changed over the past 4 years.  No contraindication to proceeding with stent placement tomorrow.  The patient has her yearly mammogram scheduled for May, and I have referred her to breast surgery so they can follow these findings and perform any biopsies or surgical intervention in the future as necessary.      Ericka Delaney MD

## 2024-03-27 ENCOUNTER — APPOINTMENT (OUTPATIENT)
Dept: CARDIOLOGY | Facility: HOSPITAL | Age: 54
DRG: 322 | End: 2024-03-27
Payer: COMMERCIAL

## 2024-03-27 ENCOUNTER — HOSPITAL ENCOUNTER (OUTPATIENT)
Facility: HOSPITAL | Age: 54
Setting detail: OBSERVATION
LOS: 1 days | Discharge: HOME | DRG: 322 | End: 2024-03-28
Attending: INTERNAL MEDICINE | Admitting: INTERNAL MEDICINE
Payer: COMMERCIAL

## 2024-03-27 DIAGNOSIS — I10 PRIMARY HYPERTENSION: ICD-10-CM

## 2024-03-27 DIAGNOSIS — R94.39 ABNORMAL CARDIOVASCULAR STRESS TEST: Primary | ICD-10-CM

## 2024-03-27 DIAGNOSIS — I25.10 ATHEROSCLEROSIS OF NATIVE CORONARY ARTERY OF NATIVE HEART WITHOUT ANGINA PECTORIS: ICD-10-CM

## 2024-03-27 DIAGNOSIS — R07.9 CHEST PAIN: ICD-10-CM

## 2024-03-27 DIAGNOSIS — R94.30 ABNORMAL RESULT OF CARDIOVASCULAR FUNCTION STUDY, UNSPECIFIED: ICD-10-CM

## 2024-03-27 PROCEDURE — 99152 MOD SED SAME PHYS/QHP 5/>YRS: CPT | Performed by: INTERNAL MEDICINE

## 2024-03-27 PROCEDURE — 2550000001 HC RX 255 CONTRASTS: Performed by: INTERNAL MEDICINE

## 2024-03-27 PROCEDURE — C1725 CATH, TRANSLUMIN NON-LASER: HCPCS | Performed by: INTERNAL MEDICINE

## 2024-03-27 PROCEDURE — 2500000002 HC RX 250 W HCPCS SELF ADMINISTERED DRUGS (ALT 637 FOR MEDICARE OP, ALT 636 FOR OP/ED): Performed by: INTERNAL MEDICINE

## 2024-03-27 PROCEDURE — G0269 OCCLUSIVE DEVICE IN VEIN ART: HCPCS | Mod: TC,59 | Performed by: INTERNAL MEDICINE

## 2024-03-27 PROCEDURE — 2780000003 HC OR 278 NO HCPCS: Performed by: INTERNAL MEDICINE

## 2024-03-27 PROCEDURE — G0378 HOSPITAL OBSERVATION PER HR: HCPCS

## 2024-03-27 PROCEDURE — 85347 COAGULATION TIME ACTIVATED: CPT

## 2024-03-27 PROCEDURE — 2500000005 HC RX 250 GENERAL PHARMACY W/O HCPCS: Performed by: INTERNAL MEDICINE

## 2024-03-27 PROCEDURE — C1887 CATHETER, GUIDING: HCPCS | Performed by: INTERNAL MEDICINE

## 2024-03-27 PROCEDURE — 93005 ELECTROCARDIOGRAM TRACING: CPT | Mod: 59

## 2024-03-27 PROCEDURE — 99153 MOD SED SAME PHYS/QHP EA: CPT | Performed by: INTERNAL MEDICINE

## 2024-03-27 PROCEDURE — C1760 CLOSURE DEV, VASC: HCPCS | Performed by: INTERNAL MEDICINE

## 2024-03-27 PROCEDURE — C1874 STENT, COATED/COV W/DEL SYS: HCPCS | Performed by: INTERNAL MEDICINE

## 2024-03-27 PROCEDURE — 2500000001 HC RX 250 WO HCPCS SELF ADMINISTERED DRUGS (ALT 637 FOR MEDICARE OP): Performed by: INTERNAL MEDICINE

## 2024-03-27 PROCEDURE — 2060000001 HC INTERMEDIATE ICU ROOM DAILY

## 2024-03-27 PROCEDURE — 2720000007 HC OR 272 NO HCPCS: Performed by: INTERNAL MEDICINE

## 2024-03-27 PROCEDURE — 93010 ELECTROCARDIOGRAM REPORT: CPT | Performed by: INTERNAL MEDICINE

## 2024-03-27 PROCEDURE — C1769 GUIDE WIRE: HCPCS | Performed by: INTERNAL MEDICINE

## 2024-03-27 PROCEDURE — C9600 PERC DRUG-EL COR STENT SING: HCPCS | Performed by: INTERNAL MEDICINE

## 2024-03-27 PROCEDURE — 93458 L HRT ARTERY/VENTRICLE ANGIO: CPT | Mod: 59 | Performed by: INTERNAL MEDICINE

## 2024-03-27 PROCEDURE — 2500000004 HC RX 250 GENERAL PHARMACY W/ HCPCS (ALT 636 FOR OP/ED): Mod: JZ | Performed by: INTERNAL MEDICINE

## 2024-03-27 DEVICE — STENT ONYXNG40026UX ONYX 4.00X26RX
Type: IMPLANTABLE DEVICE | Site: HEART | Status: FUNCTIONAL
Brand: ONYX FRONTIER™

## 2024-03-27 RX ORDER — FENTANYL CITRATE 50 UG/ML
INJECTION, SOLUTION INTRAMUSCULAR; INTRAVENOUS AS NEEDED
Status: DISCONTINUED | OUTPATIENT
Start: 2024-03-27 | End: 2024-03-27 | Stop reason: HOSPADM

## 2024-03-27 RX ORDER — LORAZEPAM 0.5 MG/1
0.5 TABLET ORAL EVERY 6 HOURS PRN
Status: DISCONTINUED | OUTPATIENT
Start: 2024-03-27 | End: 2024-03-28 | Stop reason: HOSPADM

## 2024-03-27 RX ORDER — DEXTROSE MONOHYDRATE AND SODIUM CHLORIDE 5; .45 G/100ML; G/100ML
100 INJECTION, SOLUTION INTRAVENOUS CONTINUOUS
Status: DISCONTINUED | OUTPATIENT
Start: 2024-03-27 | End: 2024-03-28 | Stop reason: HOSPADM

## 2024-03-27 RX ORDER — MIDAZOLAM HYDROCHLORIDE 1 MG/ML
INJECTION, SOLUTION INTRAMUSCULAR; INTRAVENOUS AS NEEDED
Status: DISCONTINUED | OUTPATIENT
Start: 2024-03-27 | End: 2024-03-27 | Stop reason: HOSPADM

## 2024-03-27 RX ORDER — NITROGLYCERIN 40 MG/100ML
INJECTION INTRAVENOUS AS NEEDED
Status: DISCONTINUED | OUTPATIENT
Start: 2024-03-27 | End: 2024-03-27 | Stop reason: HOSPADM

## 2024-03-27 RX ORDER — VALSARTAN 80 MG/1
160 TABLET ORAL DAILY
Status: DISCONTINUED | OUTPATIENT
Start: 2024-03-28 | End: 2024-03-28 | Stop reason: HOSPADM

## 2024-03-27 RX ORDER — ALBUTEROL SULFATE 90 UG/1
2 AEROSOL, METERED RESPIRATORY (INHALATION) EVERY 4 HOURS PRN
Status: DISCONTINUED | OUTPATIENT
Start: 2024-03-27 | End: 2024-03-27 | Stop reason: CLARIF

## 2024-03-27 RX ORDER — HEPARIN SODIUM 1000 [USP'U]/ML
INJECTION, SOLUTION INTRAVENOUS; SUBCUTANEOUS AS NEEDED
Status: DISCONTINUED | OUTPATIENT
Start: 2024-03-27 | End: 2024-03-27 | Stop reason: HOSPADM

## 2024-03-27 RX ORDER — BISMUTH SUBSALICYLATE 262 MG
1 TABLET,CHEWABLE ORAL DAILY
Status: DISCONTINUED | OUTPATIENT
Start: 2024-03-28 | End: 2024-03-28 | Stop reason: HOSPADM

## 2024-03-27 RX ORDER — ACETAMINOPHEN 325 MG/1
650 TABLET ORAL EVERY 6 HOURS PRN
Status: DISCONTINUED | OUTPATIENT
Start: 2024-03-27 | End: 2024-03-28 | Stop reason: HOSPADM

## 2024-03-27 RX ORDER — NAPROXEN SODIUM 220 MG/1
81 TABLET, FILM COATED ORAL DAILY
Status: DISCONTINUED | OUTPATIENT
Start: 2024-03-27 | End: 2024-03-27

## 2024-03-27 RX ORDER — ATORVASTATIN CALCIUM 80 MG/1
80 TABLET, FILM COATED ORAL NIGHTLY
Status: DISCONTINUED | OUTPATIENT
Start: 2024-03-27 | End: 2024-03-28 | Stop reason: HOSPADM

## 2024-03-27 RX ORDER — SODIUM CHLORIDE 9 MG/ML
125 INJECTION, SOLUTION INTRAVENOUS CONTINUOUS
Status: ACTIVE | OUTPATIENT
Start: 2024-03-27 | End: 2024-03-27

## 2024-03-27 RX ORDER — ACETAMINOPHEN 650 MG/1
650 SUPPOSITORY RECTAL EVERY 6 HOURS PRN
Status: DISCONTINUED | OUTPATIENT
Start: 2024-03-27 | End: 2024-03-28 | Stop reason: HOSPADM

## 2024-03-27 RX ORDER — AMLODIPINE AND VALSARTAN 5; 160 MG/1; MG/1
1 TABLET ORAL DAILY
Status: DISCONTINUED | OUTPATIENT
Start: 2024-03-27 | End: 2024-03-27 | Stop reason: CLARIF

## 2024-03-27 RX ORDER — ASPIRIN 325 MG
TABLET ORAL AS NEEDED
Status: DISCONTINUED | OUTPATIENT
Start: 2024-03-27 | End: 2024-03-27 | Stop reason: HOSPADM

## 2024-03-27 RX ORDER — ASPIRIN 81 MG/1
81 TABLET ORAL DAILY
Status: DISCONTINUED | OUTPATIENT
Start: 2024-03-27 | End: 2024-03-28 | Stop reason: HOSPADM

## 2024-03-27 RX ORDER — METHOTREXATE 2.5 MG/1
2.5 TABLET ORAL
Status: DISCONTINUED | OUTPATIENT
Start: 2024-03-28 | End: 2024-03-28 | Stop reason: HOSPADM

## 2024-03-27 RX ORDER — NITROGLYCERIN 0.4 MG/1
0.4 TABLET SUBLINGUAL EVERY 5 MIN PRN
Status: DISCONTINUED | OUTPATIENT
Start: 2024-03-27 | End: 2024-03-28 | Stop reason: HOSPADM

## 2024-03-27 RX ORDER — ALBUTEROL SULFATE 0.83 MG/ML
2.5 SOLUTION RESPIRATORY (INHALATION) EVERY 4 HOURS PRN
Status: DISCONTINUED | OUTPATIENT
Start: 2024-03-27 | End: 2024-03-28 | Stop reason: HOSPADM

## 2024-03-27 RX ORDER — ACETAMINOPHEN 160 MG/5ML
650 SOLUTION ORAL EVERY 6 HOURS PRN
Status: DISCONTINUED | OUTPATIENT
Start: 2024-03-27 | End: 2024-03-28 | Stop reason: HOSPADM

## 2024-03-27 RX ORDER — HYDROCHLOROTHIAZIDE 25 MG/1
25 TABLET ORAL DAILY
Status: DISCONTINUED | OUTPATIENT
Start: 2024-03-27 | End: 2024-03-28 | Stop reason: HOSPADM

## 2024-03-27 RX ORDER — AMLODIPINE BESYLATE 5 MG/1
5 TABLET ORAL DAILY
Status: DISCONTINUED | OUTPATIENT
Start: 2024-03-28 | End: 2024-03-28 | Stop reason: HOSPADM

## 2024-03-27 RX ORDER — HYDROCHLOROTHIAZIDE 25 MG/1
25 TABLET ORAL DAILY
COMMUNITY

## 2024-03-27 RX ORDER — IODIXANOL 320 MG/ML
INJECTION, SOLUTION INTRAVASCULAR AS NEEDED
Status: DISCONTINUED | OUTPATIENT
Start: 2024-03-27 | End: 2024-03-27 | Stop reason: HOSPADM

## 2024-03-27 RX ORDER — ESCITALOPRAM OXALATE 20 MG/1
20 TABLET ORAL DAILY
Status: DISCONTINUED | OUTPATIENT
Start: 2024-03-27 | End: 2024-03-28 | Stop reason: HOSPADM

## 2024-03-27 RX ORDER — METOPROLOL SUCCINATE 50 MG/1
25 TABLET, EXTENDED RELEASE ORAL DAILY
Status: DISCONTINUED | OUTPATIENT
Start: 2024-03-27 | End: 2024-03-28 | Stop reason: HOSPADM

## 2024-03-27 RX ORDER — LIDOCAINE HYDROCHLORIDE 10 MG/ML
INJECTION, SOLUTION EPIDURAL; INFILTRATION; INTRACAUDAL; PERINEURAL AS NEEDED
Status: DISCONTINUED | OUTPATIENT
Start: 2024-03-27 | End: 2024-03-27 | Stop reason: HOSPADM

## 2024-03-27 RX ORDER — ARIPIPRAZOLE 5 MG/1
5 TABLET ORAL DAILY
Status: DISCONTINUED | OUTPATIENT
Start: 2024-03-27 | End: 2024-03-28 | Stop reason: HOSPADM

## 2024-03-27 RX ORDER — FOLIC ACID 1 MG/1
1 TABLET ORAL DAILY
Status: DISCONTINUED | OUTPATIENT
Start: 2024-03-27 | End: 2024-03-28 | Stop reason: HOSPADM

## 2024-03-27 RX ORDER — DEXMETHYLPHENIDATE HYDROCHLORIDE 15 MG/1
15 CAPSULE, EXTENDED RELEASE ORAL DAILY
Status: DISCONTINUED | OUTPATIENT
Start: 2024-03-27 | End: 2024-03-28 | Stop reason: HOSPADM

## 2024-03-27 RX ADMIN — METOPROLOL SUCCINATE 25 MG: 50 TABLET, FILM COATED, EXTENDED RELEASE ORAL at 17:30

## 2024-03-27 RX ADMIN — ATORVASTATIN CALCIUM 80 MG: 80 TABLET, FILM COATED ORAL at 20:23

## 2024-03-27 RX ADMIN — SODIUM CHLORIDE 125 ML/HR: 9 INJECTION, SOLUTION INTRAVENOUS at 17:30

## 2024-03-27 RX ADMIN — TICAGRELOR 90 MG: 90 TABLET ORAL at 20:23

## 2024-03-27 SDOH — ECONOMIC STABILITY: TRANSPORTATION INSECURITY
IN THE PAST 12 MONTHS, HAS LACK OF TRANSPORTATION KEPT YOU FROM MEETINGS, WORK, OR FROM GETTING THINGS NEEDED FOR DAILY LIVING?: NO

## 2024-03-27 SDOH — SOCIAL STABILITY: SOCIAL INSECURITY: WITHIN THE LAST YEAR, HAVE YOU BEEN AFRAID OF YOUR PARTNER OR EX-PARTNER?: NO

## 2024-03-27 SDOH — ECONOMIC STABILITY: HOUSING INSECURITY
IN THE LAST 12 MONTHS, WAS THERE A TIME WHEN YOU DID NOT HAVE A STEADY PLACE TO SLEEP OR SLEPT IN A SHELTER (INCLUDING NOW)?: NO

## 2024-03-27 SDOH — HEALTH STABILITY: PHYSICAL HEALTH: ON AVERAGE, HOW MANY DAYS PER WEEK DO YOU ENGAGE IN MODERATE TO STRENUOUS EXERCISE (LIKE A BRISK WALK)?: 7 DAYS

## 2024-03-27 SDOH — ECONOMIC STABILITY: FOOD INSECURITY: WITHIN THE PAST 12 MONTHS, THE FOOD YOU BOUGHT JUST DIDN'T LAST AND YOU DIDN'T HAVE MONEY TO GET MORE.: NEVER TRUE

## 2024-03-27 SDOH — ECONOMIC STABILITY: INCOME INSECURITY: HOW HARD IS IT FOR YOU TO PAY FOR THE VERY BASICS LIKE FOOD, HOUSING, MEDICAL CARE, AND HEATING?: NOT HARD AT ALL

## 2024-03-27 SDOH — SOCIAL STABILITY: SOCIAL INSECURITY
WITHIN THE LAST YEAR, HAVE TO BEEN RAPED OR FORCED TO HAVE ANY KIND OF SEXUAL ACTIVITY BY YOUR PARTNER OR EX-PARTNER?: NO

## 2024-03-27 SDOH — SOCIAL STABILITY: SOCIAL INSECURITY: WITHIN THE LAST YEAR, HAVE YOU BEEN HUMILIATED OR EMOTIONALLY ABUSED IN OTHER WAYS BY YOUR PARTNER OR EX-PARTNER?: NO

## 2024-03-27 SDOH — SOCIAL STABILITY: SOCIAL INSECURITY: ARE YOU OR HAVE YOU BEEN THREATENED OR ABUSED PHYSICALLY, EMOTIONALLY, OR SEXUALLY BY ANYONE?: NO

## 2024-03-27 SDOH — SOCIAL STABILITY: SOCIAL NETWORK: HOW OFTEN DO YOU ATTENT MEETINGS OF THE CLUB OR ORGANIZATION YOU BELONG TO?: NEVER

## 2024-03-27 SDOH — SOCIAL STABILITY: SOCIAL INSECURITY: HAVE YOU HAD THOUGHTS OF HARMING ANYONE ELSE?: NO

## 2024-03-27 SDOH — ECONOMIC STABILITY: INCOME INSECURITY: IN THE LAST 12 MONTHS, WAS THERE A TIME WHEN YOU WERE NOT ABLE TO PAY THE MORTGAGE OR RENT ON TIME?: NO

## 2024-03-27 SDOH — SOCIAL STABILITY: SOCIAL NETWORK: HOW OFTEN DO YOU GET TOGETHER WITH FRIENDS OR RELATIVES?: MORE THAN THREE TIMES A WEEK

## 2024-03-27 SDOH — SOCIAL STABILITY: SOCIAL INSECURITY
WITHIN THE LAST YEAR, HAVE YOU BEEN KICKED, HIT, SLAPPED, OR OTHERWISE PHYSICALLY HURT BY YOUR PARTNER OR EX-PARTNER?: NO

## 2024-03-27 SDOH — SOCIAL STABILITY: SOCIAL NETWORK
DO YOU BELONG TO ANY CLUBS OR ORGANIZATIONS SUCH AS CHURCH GROUPS UNIONS, FRATERNAL OR ATHLETIC GROUPS, OR SCHOOL GROUPS?: NO

## 2024-03-27 SDOH — ECONOMIC STABILITY: HOUSING INSECURITY: IN THE LAST 12 MONTHS, HOW MANY PLACES HAVE YOU LIVED?: 1

## 2024-03-27 SDOH — SOCIAL STABILITY: SOCIAL INSECURITY: ARE THERE ANY APPARENT SIGNS OF INJURIES/BEHAVIORS THAT COULD BE RELATED TO ABUSE/NEGLECT?: NO

## 2024-03-27 SDOH — ECONOMIC STABILITY: FOOD INSECURITY: WITHIN THE PAST 12 MONTHS, YOU WORRIED THAT YOUR FOOD WOULD RUN OUT BEFORE YOU GOT MONEY TO BUY MORE.: NEVER TRUE

## 2024-03-27 SDOH — HEALTH STABILITY: MENTAL HEALTH
STRESS IS WHEN SOMEONE FEELS TENSE, NERVOUS, ANXIOUS, OR CAN'T SLEEP AT NIGHT BECAUSE THEIR MIND IS TROUBLED. HOW STRESSED ARE YOU?: NOT AT ALL

## 2024-03-27 SDOH — SOCIAL STABILITY: SOCIAL INSECURITY: WERE YOU ABLE TO COMPLETE ALL THE BEHAVIORAL HEALTH SCREENINGS?: YES

## 2024-03-27 SDOH — ECONOMIC STABILITY: TRANSPORTATION INSECURITY
IN THE PAST 12 MONTHS, HAS THE LACK OF TRANSPORTATION KEPT YOU FROM MEDICAL APPOINTMENTS OR FROM GETTING MEDICATIONS?: NO

## 2024-03-27 SDOH — HEALTH STABILITY: PHYSICAL HEALTH: ON AVERAGE, HOW MANY MINUTES DO YOU ENGAGE IN EXERCISE AT THIS LEVEL?: 30 MIN

## 2024-03-27 SDOH — SOCIAL STABILITY: SOCIAL INSECURITY: DOES ANYONE TRY TO KEEP YOU FROM HAVING/CONTACTING OTHER FRIENDS OR DOING THINGS OUTSIDE YOUR HOME?: NO

## 2024-03-27 SDOH — SOCIAL STABILITY: SOCIAL INSECURITY: ABUSE: ADULT

## 2024-03-27 SDOH — SOCIAL STABILITY: SOCIAL INSECURITY: DO YOU FEEL ANYONE HAS EXPLOITED OR TAKEN ADVANTAGE OF YOU FINANCIALLY OR OF YOUR PERSONAL PROPERTY?: NO

## 2024-03-27 SDOH — SOCIAL STABILITY: SOCIAL NETWORK
IN A TYPICAL WEEK, HOW MANY TIMES DO YOU TALK ON THE PHONE WITH FAMILY, FRIENDS, OR NEIGHBORS?: MORE THAN THREE TIMES A WEEK

## 2024-03-27 SDOH — SOCIAL STABILITY: SOCIAL NETWORK: ARE YOU MARRIED, WIDOWED, DIVORCED, SEPARATED, NEVER MARRIED, OR LIVING WITH A PARTNER?: PATIENT DECLINED

## 2024-03-27 SDOH — SOCIAL STABILITY: SOCIAL INSECURITY: DO YOU FEEL UNSAFE GOING BACK TO THE PLACE WHERE YOU ARE LIVING?: NO

## 2024-03-27 SDOH — SOCIAL STABILITY: SOCIAL INSECURITY: HAS ANYONE EVER THREATENED TO HURT YOUR FAMILY OR YOUR PETS?: NO

## 2024-03-27 SDOH — SOCIAL STABILITY: SOCIAL NETWORK: HOW OFTEN DO YOU ATTEND CHURCH OR RELIGIOUS SERVICES?: NEVER

## 2024-03-27 ASSESSMENT — COGNITIVE AND FUNCTIONAL STATUS - GENERAL
MOBILITY SCORE: 24
MOBILITY SCORE: 24
DAILY ACTIVITIY SCORE: 24
DAILY ACTIVITIY SCORE: 24
PATIENT BASELINE BEDBOUND: NO

## 2024-03-27 ASSESSMENT — LIFESTYLE VARIABLES
AUDIT-C TOTAL SCORE: 1
PRESCIPTION_ABUSE_PAST_12_MONTHS: NO
HOW MANY STANDARD DRINKS CONTAINING ALCOHOL DO YOU HAVE ON A TYPICAL DAY: 1 OR 2
SKIP TO QUESTIONS 9-10: 1
HOW OFTEN DO YOU HAVE A DRINK CONTAINING ALCOHOL: MONTHLY OR LESS
SUBSTANCE_ABUSE_PAST_12_MONTHS: NO
AUDIT-C TOTAL SCORE: 1
HOW OFTEN DO YOU HAVE 6 OR MORE DRINKS ON ONE OCCASION: NEVER

## 2024-03-27 ASSESSMENT — PAIN SCALES - GENERAL
PAINLEVEL_OUTOF10: 0 - NO PAIN
PAINLEVEL_OUTOF10: 0 - NO PAIN

## 2024-03-27 ASSESSMENT — ACTIVITIES OF DAILY LIVING (ADL)
TOILETING: INDEPENDENT
BATHING: INDEPENDENT
LACK_OF_TRANSPORTATION: NO
FEEDING YOURSELF: INDEPENDENT
HEARING - LEFT EAR: FUNCTIONAL
ADEQUATE_TO_COMPLETE_ADL: YES
HEARING - RIGHT EAR: FUNCTIONAL
DRESSING YOURSELF: INDEPENDENT
PATIENT'S MEMORY ADEQUATE TO SAFELY COMPLETE DAILY ACTIVITIES?: YES
WALKS IN HOME: INDEPENDENT
JUDGMENT_ADEQUATE_SAFELY_COMPLETE_DAILY_ACTIVITIES: YES
GROOMING: INDEPENDENT

## 2024-03-27 ASSESSMENT — PATIENT HEALTH QUESTIONNAIRE - PHQ9
2. FEELING DOWN, DEPRESSED OR HOPELESS: NOT AT ALL
SUM OF ALL RESPONSES TO PHQ9 QUESTIONS 1 & 2: 0
1. LITTLE INTEREST OR PLEASURE IN DOING THINGS: NOT AT ALL

## 2024-03-27 ASSESSMENT — PAIN - FUNCTIONAL ASSESSMENT
PAIN_FUNCTIONAL_ASSESSMENT: 0-10
PAIN_FUNCTIONAL_ASSESSMENT: 0-10

## 2024-03-27 ASSESSMENT — COLUMBIA-SUICIDE SEVERITY RATING SCALE - C-SSRS
6. HAVE YOU EVER DONE ANYTHING, STARTED TO DO ANYTHING, OR PREPARED TO DO ANYTHING TO END YOUR LIFE?: NO
2. HAVE YOU ACTUALLY HAD ANY THOUGHTS OF KILLING YOURSELF?: NO
1. IN THE PAST MONTH, HAVE YOU WISHED YOU WERE DEAD OR WISHED YOU COULD GO TO SLEEP AND NOT WAKE UP?: NO

## 2024-03-27 NOTE — Clinical Note
Angioplasty of the proximal left anterior descending lesion. Inflation 1: Pressure = 14 ana; Duration = 60 sec.

## 2024-03-27 NOTE — CARE PLAN
The patient's goals for the shift include      The clinical goals for the shift include maintain stable vital signs

## 2024-03-27 NOTE — BRIEF OP NOTE
Physician Transition of Care Summary  Invasive Cardiovascular Lab    Procedure Date: 3/27/2024  Attending:    * Josse Chou - Primary  Resident/Fellow/Other Assistant: Surgeon(s) and Role:    Indications:   Pre-op Diagnosis     * Abnormal cardiovascular stress test [R94.39]     * Chest pain [R07.9]    Post-procedure diagnosis:   Post-op Diagnosis     * Abnormal cardiovascular stress test [R94.39]     * Chest pain [R07.9]    Procedure(s):   Left Heart Cath, No LV  55328 - ME CATH PLMT L HRT & ARTS W/NJX & ANGIO IMG S&I    PCI  10424 - ME PRQ TRLUML CORONARY ANGIOPLASTY ONE ART/BRANCH        Procedure Findings:   Stent to proximal LAD     Description of the Procedure:   Please see cath report    Complications:   none    Stents/Implants:   Cardiovascular Implants       Stent    Stent, Delfino Haralson Zeyad, 4.00 X 26rx - [id number] - Implanted        Inventory item: STENT, DELFINO FRONTIER ZEYAD, 4.00 X 26RX Model/Cat number: [id number]    : [employer] Lot number: [id number]    Device identifier: [id number]        As of 3/27/2024       Status: Implanted                              Anticoagulation/Antiplatelet Plan:   DAPT for six months    Estimated Blood Loss:   25 mL    Anesthesia: Moderate Sedation Anesthesia Staff: No anesthesia staff entered.    Any Specimen(s) Removed:   No specimens collected during this procedure.    Disposition:   To telemetry for observation      Electronically signed by: Josse Chou MD, 3/27/2024 4:42 PM

## 2024-03-28 ENCOUNTER — PHARMACY VISIT (OUTPATIENT)
Dept: PHARMACY | Facility: CLINIC | Age: 54
End: 2024-03-28
Payer: MEDICARE

## 2024-03-28 ENCOUNTER — APPOINTMENT (OUTPATIENT)
Dept: CARDIOLOGY | Facility: HOSPITAL | Age: 54
DRG: 322 | End: 2024-03-28
Payer: COMMERCIAL

## 2024-03-28 VITALS
HEIGHT: 66 IN | OXYGEN SATURATION: 97 % | DIASTOLIC BLOOD PRESSURE: 73 MMHG | WEIGHT: 230.16 LBS | TEMPERATURE: 98.1 F | HEART RATE: 77 BPM | SYSTOLIC BLOOD PRESSURE: 116 MMHG | BODY MASS INDEX: 36.99 KG/M2 | RESPIRATION RATE: 18 BRPM

## 2024-03-28 LAB
ALBUMIN SERPL-MCNC: 4 G/DL (ref 3.5–5)
ANION GAP SERPL CALC-SCNC: 9 MMOL/L
ATRIAL RATE: 67 BPM
BUN SERPL-MCNC: 13 MG/DL (ref 8–25)
CALCIUM SERPL-MCNC: 9.2 MG/DL (ref 8.5–10.4)
CHLORIDE SERPL-SCNC: 105 MMOL/L (ref 97–107)
CO2 SERPL-SCNC: 24 MMOL/L (ref 24–31)
CREAT SERPL-MCNC: 0.6 MG/DL (ref 0.4–1.6)
EGFRCR SERPLBLD CKD-EPI 2021: >90 ML/MIN/1.73M*2
ERYTHROCYTE [DISTWIDTH] IN BLOOD BY AUTOMATED COUNT: 13.4 % (ref 11.5–14.5)
GLUCOSE SERPL-MCNC: 90 MG/DL (ref 65–99)
HCT VFR BLD AUTO: 34.4 % (ref 36–46)
HGB BLD-MCNC: 11.5 G/DL (ref 12–16)
MCH RBC QN AUTO: 30.9 PG (ref 26–34)
MCHC RBC AUTO-ENTMCNC: 33.4 G/DL (ref 32–36)
MCV RBC AUTO: 93 FL (ref 80–100)
NRBC BLD-RTO: 0 /100 WBCS (ref 0–0)
P AXIS: 46 DEGREES
P OFFSET: 190 MS
P ONSET: 143 MS
PHOSPHATE SERPL-MCNC: 3.4 MG/DL (ref 2.5–4.5)
PLATELET # BLD AUTO: 214 X10*3/UL (ref 150–450)
POTASSIUM SERPL-SCNC: 3.9 MMOL/L (ref 3.4–5.1)
PR INTERVAL: 152 MS
Q ONSET: 219 MS
QRS COUNT: 12 BEATS
QRS DURATION: 86 MS
QT INTERVAL: 402 MS
QTC CALCULATION(BAZETT): 424 MS
QTC FREDERICIA: 417 MS
R AXIS: 42 DEGREES
RBC # BLD AUTO: 3.72 X10*6/UL (ref 4–5.2)
SODIUM SERPL-SCNC: 138 MMOL/L (ref 133–145)
T AXIS: 46 DEGREES
T OFFSET: 420 MS
VENTRICULAR RATE: 67 BPM
WBC # BLD AUTO: 6.4 X10*3/UL (ref 4.4–11.3)

## 2024-03-28 PROCEDURE — 36415 COLL VENOUS BLD VENIPUNCTURE: CPT | Performed by: INTERNAL MEDICINE

## 2024-03-28 PROCEDURE — 80069 RENAL FUNCTION PANEL: CPT | Performed by: INTERNAL MEDICINE

## 2024-03-28 PROCEDURE — 2500000001 HC RX 250 WO HCPCS SELF ADMINISTERED DRUGS (ALT 637 FOR MEDICARE OP): Performed by: INTERNAL MEDICINE

## 2024-03-28 PROCEDURE — 2500000002 HC RX 250 W HCPCS SELF ADMINISTERED DRUGS (ALT 637 FOR MEDICARE OP, ALT 636 FOR OP/ED)

## 2024-03-28 PROCEDURE — G0378 HOSPITAL OBSERVATION PER HR: HCPCS

## 2024-03-28 PROCEDURE — 85027 COMPLETE CBC AUTOMATED: CPT | Performed by: INTERNAL MEDICINE

## 2024-03-28 PROCEDURE — 2500000001 HC RX 250 WO HCPCS SELF ADMINISTERED DRUGS (ALT 637 FOR MEDICARE OP)

## 2024-03-28 PROCEDURE — 93005 ELECTROCARDIOGRAM TRACING: CPT

## 2024-03-28 PROCEDURE — 2500000002 HC RX 250 W HCPCS SELF ADMINISTERED DRUGS (ALT 637 FOR MEDICARE OP, ALT 636 FOR OP/ED): Performed by: INTERNAL MEDICINE

## 2024-03-28 PROCEDURE — RXMED WILLOW AMBULATORY MEDICATION CHARGE

## 2024-03-28 RX ORDER — VALSARTAN 160 MG/1
160 TABLET ORAL DAILY
Start: 2024-03-29

## 2024-03-28 RX ADMIN — TICAGRELOR 90 MG: 90 TABLET ORAL at 09:00

## 2024-03-28 RX ADMIN — METOPROLOL SUCCINATE 25 MG: 50 TABLET, FILM COATED, EXTENDED RELEASE ORAL at 09:46

## 2024-03-28 RX ADMIN — VALSARTAN 160 MG: 80 TABLET, FILM COATED ORAL at 09:46

## 2024-03-28 RX ADMIN — AMLODIPINE BESYLATE 5 MG: 5 TABLET ORAL at 09:46

## 2024-03-28 ASSESSMENT — PAIN - FUNCTIONAL ASSESSMENT
PAIN_FUNCTIONAL_ASSESSMENT: 0-10

## 2024-03-28 ASSESSMENT — COGNITIVE AND FUNCTIONAL STATUS - GENERAL
MOBILITY SCORE: 24
DAILY ACTIVITIY SCORE: 24

## 2024-03-28 ASSESSMENT — ACTIVITIES OF DAILY LIVING (ADL): LACK_OF_TRANSPORTATION: NO

## 2024-03-28 ASSESSMENT — PAIN SCALES - GENERAL
PAINLEVEL_OUTOF10: 0 - NO PAIN

## 2024-03-28 NOTE — CARE PLAN
The patient's goals for the shift include      The clinical goals for the shift include Pain  Free

## 2024-03-28 NOTE — CARE PLAN
Problem: Pain  Goal: My pain/discomfort is manageable  Outcome: Progressing     Problem: Safety  Goal: Patient will be injury free during hospitalization  Outcome: Progressing  Goal: I will remain free of falls  Outcome: Progressing     Problem: Daily Care  Goal: Daily care needs are met  Outcome: Progressing     Problem: Psychosocial Needs  Goal: Demonstrates ability to cope with hospitalization/illness  Outcome: Progressing  Goal: Collaborate with me, my family, and caregiver to identify my specific goals  Outcome: Progressing     Problem: Discharge Barriers  Goal: My discharge needs are met  Outcome: Progressing     Problem: Pain  Goal: Takes deep breaths with improved pain control throughout the shift  Outcome: Progressing  Goal: Turns in bed with improved pain control throughout the shift  Outcome: Progressing  Goal: Walks with improved pain control throughout the shift  Outcome: Progressing  Goal: Performs ADL's with improved pain control throughout shift  Outcome: Progressing  Goal: Participates in PT with improved pain control throughout the shift  Outcome: Progressing  Goal: Free from opioid side effects throughout the shift  Outcome: Progressing  Goal: Free from acute confusion related to pain meds throughout the shift  Outcome: Progressing     Problem: Fall/Injury  Goal: Not fall by end of shift  Outcome: Progressing  Goal: Be free from injury by end of the shift  Outcome: Progressing  Goal: Verbalize understanding of personal risk factors for fall in the hospital  Outcome: Progressing  Goal: Verbalize understanding of risk factor reduction measures to prevent injury from fall in the home  Outcome: Progressing  Goal: Use assistive devices by end of the shift  Outcome: Progressing  Goal: Pace activities to prevent fatigue by end of the shift  Outcome: Progressing     Problem: Skin  Goal: Decreased wound size/increased tissue granulation at next dressing change  Outcome: Progressing  Goal: Participates in  plan/prevention/treatment measures  Outcome: Progressing  Goal: Prevent/manage excess moisture  Outcome: Progressing  Goal: Prevent/minimize sheer/friction injuries  Outcome: Progressing  Goal: Promote/optimize nutrition  Outcome: Progressing  Goal: Promote skin healing  Outcome: Progressing   The patient's goals for the shift include      The clinical goals for the shift include maintain stable vital signs    Over the shift, the patient did not make progress toward the following goals. Barriers to progression include . Recommendations to address these barriers include .

## 2024-03-28 NOTE — NURSING NOTE
No changes in assessment. Awake, Assisted to bathroom. Voided. Back to bed, No complaint presented. Will continue to monitor. Call light in reach.

## 2024-03-28 NOTE — PROGRESS NOTES
TCC met with patient at bedside. Introduced self and explained role. Patient lives home with sister. There are 1 steps to enter and a ramp and no steps within the home. Patient has no medical equipment and no cane or walker. No reports of smoking or alcohol use.  PCP is Gerry Brumfield . [facility] is pharmacy of choice for medications. Patient has LW and POA. Plan is to return home with no skilled needs     03/28/24 1000   Discharge Planning   Living Arrangements Family members   Support Systems Family members   Assistance Needed no   Type of Residence Private residence   Number of Stairs to Enter Residence 1   Number of Stairs Within Residence 0   Who is requesting discharge planning? Provider   Home or Post Acute Services None   Patient expects to be discharged to: Home   Does the patient need discharge transport arranged? No   Financial Resource Strain   How hard is it for you to pay for the very basics like food, housing, medical care, and heating? Not hard   Housing Stability   In the last 12 months, was there a time when you were not able to pay the mortgage or rent on time? N   In the last 12 months, how many places have you lived? 1   In the last 12 months, was there a time when you did not have a steady place to sleep or slept in a shelter (including now)? N   Transportation Needs   In the past 12 months, has lack of transportation kept you from medical appointments or from getting medications? no   In the past 12 months, has lack of transportation kept you from meetings, work, or from getting things needed for daily living? No

## 2024-03-28 NOTE — CONSULTS
"Nutrition Assessement Note  Admitted after abnormal cardiac stress test. Cardiac cath w/stent placement 3/27. Patient agreeable to low sodium diet education at time of visit. States good appetite with recent weight gain.    Nutrition Assessment    Reason for Assessment: Provider consult order (Education)    Reason for Hospital Admission:  Vidya Landry is a 53 y.o. female who is admitted for cardiac cath.    Nutrition History:     Energy Intake: Good > 75 %  Food Allergies/Intolerances:  None  GI Symptoms: None  Oral Problems: None    Anthropometrics:  Ht: 167.6 cm (5' 6\"), Wt: 104 kg (230 lb 2.6 oz), BMI: 37.17  IBW/kg (Dietitian Calculated): 59.09 kg  Percent of IBW: 177 %  Adjusted Body Weight (kg): 70.45 kg    Weight Change:  Daily Weight  03/28/24 : 104 kg (230 lb 2.6 oz)  03/26/24 : 101 kg (222 lb)  03/20/24 : 100 kg (221 lb)  06/21/23 : 81 kg (178 lb 8 oz)  02/16/23 : 78.9 kg (174 lb)  01/23/23 : 79.8 kg (176 lb)  07/21/22 : 82.6 kg (182 lb)  04/26/22 : 82.6 kg (182 lb)  02/16/22 : 82.1 kg (181 lb)  12/27/21 : 90.7 kg (200 lb)     Weight History / % Weight Change: Patient reports 20# weight gain in past few months d/t stress     Nutrition Focused Physical Exam Findings:   Subcutaneous Fat Loss  Orbital Fat Pads: Well nourished (slightly bulging fat pads)  Buccal Fat Pads: Well nourished (full, rounded cheeks)  Triceps: Well nourished (ample fat tissue)  Ribs: Defer    Muscle Wasting  Temporalis: Well nourished (well-defined muscle)  Pectoralis (Clavicular Region): Well nourished (clavicle not visible)  Deltoid/Trapezius: Well nourished (rounded appearance at arm, shoulder, neck)  Interosseous: Defer  Trapezius/Infraspinatus/Supraspinatus (Scapular Region): Defer  Quadriceps: Defer  Gastrocnemius: Defer    Nutrition Significant Labs:  Lab Results   Component Value Date    WBC 6.4 03/28/2024    HGB 11.5 (L) 03/28/2024    HCT 34.4 (L) 03/28/2024     03/28/2024    CHOL 226 (H) 03/25/2024    TRIG 149 " 03/25/2024    HDL 83.0 03/25/2024    ALT 20 03/25/2024    AST 19 03/25/2024     03/28/2024    K 3.9 03/28/2024     03/28/2024    CREATININE 0.60 03/28/2024    BUN 13 03/28/2024    CO2 24 03/28/2024    TSH 1.92 03/25/2024    HGBA1C 5.2 03/25/2024       Current Facility-Administered Medications:     acetaminophen (Tylenol) tablet 650 mg, 650 mg, oral, q6h PRN **OR** acetaminophen (Tylenol) oral liquid 650 mg, 650 mg, nasogastric tube, q6h PRN **OR** acetaminophen (Tylenol) suppository 650 mg, 650 mg, rectal, q6h PRN, Josse Chou MD    albuterol 2.5 mg /3 mL (0.083 %) nebulizer solution 2.5 mg, 2.5 mg, nebulization, q4h PRN, Stevenson Wadsworth, PharmD    amLODIPine (Norvasc) tablet 5 mg, 5 mg, oral, Daily, Stevenson Wadsworth, PharmD, 5 mg at 03/28/24 0946    [MAR Hold] ARIPiprazole (Abilify) tablet 5 mg, 5 mg, oral, Daily, Josse Chou MD    [MAR Hold] aspirin EC tablet 81 mg, 81 mg, oral, Daily, Josse Chou MD    atorvastatin (Lipitor) tablet 80 mg, 80 mg, oral, Nightly, Josse Chou MD, 80 mg at 03/27/24 2023    [MAR Hold] dexmethylphenidate XR (Focalin XR) 24 hr capsule 15 mg, 15 mg, oral, Daily, Josse Chou MD    dextrose 5%-0.45 % sodium chloride infusion, 100 mL/hr, intravenous, Continuous, Josse Chou MD    [MAR Hold] escitalopram (Lexapro) tablet 20 mg, 20 mg, oral, Daily, Josse Chou MD    [MAR Hold] folic acid (Folvite) tablet 1 mg, 1 mg, oral, Daily, Josse Chou MD    [MAR Hold] hydroCHLOROthiazide (HYDRODiuril) tablet 25 mg, 25 mg, oral, Daily, Josse Chou MD    [MAR Hold] LORazepam (Ativan) tablet 0.5 mg, 0.5 mg, oral, q6h PRN, Josse Chou MD    [MAR Hold] methotrexate (Trexall) tablet 2.5 mg, 2.5 mg, oral, Weekly, Josse Chou MD    metoprolol succinate XL (Toprol-XL) 24 hr tablet 25 mg, 25 mg, oral, Daily, Josse Chou MD, 25 mg at 03/28/24 0946    [MAR Hold] multivitamin 1 tablet, 1 tablet, oral, Daily, Josse RAMIREZ  MD José Antonio    nitroglycerin (Nitrostat) SL tablet 0.4 mg, 0.4 mg, sublingual, q5 min PRN, Josse Chou MD    oxygen (O2) therapy, , inhalation, Continuous PRN - O2/gases, Josse Chou MD    ticagrelor (Brilinta) tablet 90 mg, 90 mg, oral, BID, Josse Chou MD, 90 mg at 03/28/24 0900    valsartan (Diovan) tablet 160 mg, 160 mg, oral, Daily, Stevenson Wadsworth, PharmD, 160 mg at 03/28/24 0946    Dietary Orders (From admission, onward)       Start     Ordered    03/27/24 1709  Adult diet Cardiac; 70 gm fat; 2 - 3 grams Sodium  Diet effective now        Question Answer Comment   Diet type Cardiac    Fat restriction: 70 gm fat    Sodium restriction: 2 - 3 grams Sodium        03/27/24 1708                   Estimated Needs:   Estimated Energy Needs  Total Energy Estimated Needs (kCal):  (1750-1960)  Total Estimated Energy Need per Day (kCal/kg): -3 kCal/kg  Method for Estimating Needs: Adj Wt    Estimated Protein Needs  Total Protein Estimated Needs (g): 70 g  Total Protein Estimated Needs (g/kg): 1 g/kg  Method for Estimating Needs: Adj Wt    Estimated Fluid Needs  Total Fluid Estimated Needs (mL):  (1750-1960)  Method for Estimating Needs: 1 mL/kcal      Nutrition Diagnosis   Nutrition Diagnosis:     Nutrition Diagnosis  Patient has Nutrition Diagnosis: Yes  Diagnosis Status (1): New  Nutrition Diagnosis 1: Food and nutrition related knowledge deficit  Related to (1): lack of prior exposure to accurate nutrition related information  As Evidenced by (1): new medical diagnosis       Nutrition Interventions/Recommendations   Nutrition Interventions and Recommendations:    Nutrition Prescription:  Individualized Nutrition Prescription Provided for : 1750-1960 calories, 70 gm protein via cardiac diet    Nutrition Interventions:   Food and/or Nutrient Delivery Interventions  Interventions: Meals and snacks  Meals and Snacks: Fat-modified diet, Mineral-modified diet  Goal: Provide as ordered    Education  Documentation  Nutrition Care Manual, taught by Khadijah Lincoln RD, LD at 3/28/2024 11:38 AM.  Learner: Patient  Readiness: Acceptance  Method: Explanation, Handout  Response: Verbalizes Understanding  Comment: Provided handout and discussed benefits of a low sodium diet for heart health. Offered suggestions to overcome perceived barriers to success.           Nutrition Monitoring and Evaluation   Monitoring/Evaluation:   Food/Nutrient Related History Monitoring  Monitoring and Evaluation Plan: Energy intake  Energy Intake: Estimated energy intake  Criteria: pt to consume >/= 75% estimated needs       Time Spent/Follow-up:   Follow Up  Time Spent (min): 30 minutes  Last Date of Nutrition Visit: 03/28/24  Nutrition Follow-Up Needed?: 7-10 days  Follow up Comment: 4/4/24

## 2024-03-28 NOTE — NURSING NOTE
Assumed care of patient. Post Heart Catheterization. Report done. Dressing to Right Groin dry and intact. No hematoma, No bleeding noted. Denies any pain or Shortness of Breath. IV fluid going on for hydration. Assessment as charted. Will monitor.

## 2024-03-28 NOTE — CARE PLAN
Problem: Pain  Goal: My pain/discomfort is manageable  3/27/2024 2224 by Adela Pradhan RN  Outcome: Progressing  3/27/2024 2223 by Adela Pradhan RN  Outcome: Progressing     Problem: Safety  Goal: Patient will be injury free during hospitalization  3/27/2024 2224 by Adela Pradhan RN  Outcome: Progressing  3/27/2024 2223 by Adela Pradhan RN  Outcome: Progressing  Goal: I will remain free of falls  3/27/2024 2224 by Adela Pradhan RN  Outcome: Progressing  3/27/2024 2223 by Adela Pradhan RN  Outcome: Progressing     Problem: Daily Care  Goal: Daily care needs are met  3/27/2024 2224 by Adela Pradhan RN  Outcome: Progressing  3/27/2024 2223 by Adela Pradhan RN  Outcome: Progressing     Problem: Psychosocial Needs  Goal: Demonstrates ability to cope with hospitalization/illness  3/27/2024 2224 by Adela Pradhan RN  Outcome: Progressing  3/27/2024 2223 by Adela Pradhan RN  Outcome: Progressing  Goal: Collaborate with me, my family, and caregiver to identify my specific goals  3/27/2024 2224 by Adela Pradhan RN  Outcome: Progressing  3/27/2024 2223 by Adela Pradhan RN  Outcome: Progressing     Problem: Discharge Barriers  Goal: My discharge needs are met  3/27/2024 2224 by Adela Pradhan RN  Outcome: Progressing  3/27/2024 2223 by Adela Pradhan RN  Outcome: Progressing     Problem: Pain  Goal: Takes deep breaths with improved pain control throughout the shift  3/27/2024 2224 by Adela Pradhan RN  Outcome: Progressing  3/27/2024 2223 by Adela Pradhan RN  Outcome: Progressing  Goal: Turns in bed with improved pain control throughout the shift  3/27/2024 2224 by Adela Pradhan RN  Outcome: Progressing  3/27/2024 2223 by Adela A Shoemaker, RN  Outcome: Progressing  Goal: Walks with improved pain control throughout the shift  3/27/2024 2224 by Adela PERRY  Carolynn, SOLOMON  Outcome: Progressing  3/27/2024 2223 by Adela Pradhan RN  Outcome: Progressing  Goal: Performs ADL's with improved pain control throughout shift  3/27/2024 2224 by Adela Pradhan, SOLOMON  Outcome: Progressing  3/27/2024 2223 by Adela Pradhan RN  Outcome: Progressing  Goal: Participates in PT with improved pain control throughout the shift  3/27/2024 2224 by Adela Pradhan, SOLOMON  Outcome: Progressing  3/27/2024 2223 by Adela Pradhan RN  Outcome: Progressing  Goal: Free from opioid side effects throughout the shift  3/27/2024 2224 by Adela Pradhan RN  Outcome: Progressing  3/27/2024 2223 by Adela Pradhan RN  Outcome: Progressing  Goal: Free from acute confusion related to pain meds throughout the shift  3/27/2024 2224 by Adela Pradhan RN  Outcome: Progressing  3/27/2024 2223 by Adela Pradhan RN  Outcome: Progressing     Problem: Fall/Injury  Goal: Not fall by end of shift  3/27/2024 2224 by Adela Pradhan RN  Outcome: Progressing  3/27/2024 2223 by Adela Pradhan RN  Outcome: Progressing  Goal: Be free from injury by end of the shift  3/27/2024 2224 by Adela Pradhan, SOLOMON  Outcome: Progressing  3/27/2024 2223 by Adela Pradhan RN  Outcome: Progressing  Goal: Verbalize understanding of personal risk factors for fall in the hospital  3/27/2024 2224 by Adela Pradhan RN  Outcome: Progressing  3/27/2024 2223 by Adela Pradhan RN  Outcome: Progressing  Goal: Verbalize understanding of risk factor reduction measures to prevent injury from fall in the home  3/27/2024 2224 by Adela Pradhan RN  Outcome: Progressing  3/27/2024 2223 by Adela Pradhan RN  Outcome: Progressing  Goal: Use assistive devices by end of the shift  3/27/2024 2224 by Adela Pradhan RN  Outcome: Progressing  3/27/2024 2223 by Adela Pradhan, RN  Outcome: Progressing  Goal:  Pace activities to prevent fatigue by end of the shift  3/27/2024 2224 by Adela Pradhan RN  Outcome: Progressing  3/27/2024 2223 by Adela Pradhan RN  Outcome: Progressing     Problem: Skin  Goal: Decreased wound size/increased tissue granulation at next dressing change  3/27/2024 2224 by Adela Pradhan RN  Outcome: Progressing  Flowsheets (Taken 3/27/2024 2000)  Decreased wound size/increased tissue granulation at next dressing change: Promote sleep for wound healing  3/27/2024 2223 by Adela Pradhan RN  Outcome: Progressing  Flowsheets (Taken 3/27/2024 2000)  Decreased wound size/increased tissue granulation at next dressing change: Promote sleep for wound healing  Goal: Participates in plan/prevention/treatment measures  3/27/2024 2224 by Adela Pradhan RN  Outcome: Progressing  Flowsheets (Taken 3/27/2024 2000)  Participates in plan/prevention/treatment measures: Discuss with provider PT/OT consult  3/27/2024 2223 by Adela Pradhan RN  Outcome: Progressing  Flowsheets (Taken 3/27/2024 2000)  Participates in plan/prevention/treatment measures: Discuss with provider PT/OT consult  Goal: Prevent/manage excess moisture  3/27/2024 2224 by Adela Pradhan RN  Outcome: Progressing  Flowsheets (Taken 3/27/2024 2000)  Prevent/manage excess moisture: Moisturize dry skin  3/27/2024 2223 by Adela Pradhan RN  Outcome: Progressing  Flowsheets (Taken 3/27/2024 2000)  Prevent/manage excess moisture: Moisturize dry skin  Goal: Prevent/minimize sheer/friction injuries  3/27/2024 2224 by Adela Pradhan RN  Outcome: Progressing  Flowsheets (Taken 3/27/2024 2000)  Prevent/minimize sheer/friction injuries:   Increase activity/out of bed for meals   HOB 30 degrees or less   Turn/reposition every 2 hours/use positioning/transfer devices  3/27/2024 2223 by Adela Pradhan RN  Outcome: Progressing  Flowsheets (Taken 3/27/2024 2000)  Prevent/minimize  sheer/friction injuries:   Increase activity/out of bed for meals   HOB 30 degrees or less   Turn/reposition every 2 hours/use positioning/transfer devices  Goal: Promote/optimize nutrition  3/27/2024 2224 by Adela Pradhan RN  Outcome: Progressing  Flowsheets (Taken 3/27/2024 2000)  Promote/optimize nutrition: Monitor/record intake including meals  3/27/2024 2223 by Adela Pradhan RN  Outcome: Progressing  Flowsheets (Taken 3/27/2024 2000)  Promote/optimize nutrition: Monitor/record intake including meals  Goal: Promote skin healing  3/27/2024 2224 by Adela Pradhan RN  Outcome: Progressing  Flowsheets (Taken 3/27/2024 2000)  Promote skin healing:   Assess skin/pad under line(s)/device(s)   Turn/reposition every 2 hours/use positioning/transfer devices   Protective dressings over bony prominences  3/27/2024 2223 by Adela Pradhan RN  Outcome: Progressing  Flowsheets (Taken 3/27/2024 2000)  Promote skin healing:   Assess skin/pad under line(s)/device(s)   Turn/reposition every 2 hours/use positioning/transfer devices   Protective dressings over bony prominences   The patient's goals for the shift include      The clinical goals for the shift include maintain stable vital signs    Over the shift, the patient did not make progress toward the following goals. Barriers to progression include . Recommendations to address these barriers include .

## 2024-03-29 ENCOUNTER — DOCUMENTATION (OUTPATIENT)
Dept: PRIMARY CARE | Facility: CLINIC | Age: 54
End: 2024-03-29
Payer: COMMERCIAL

## 2024-03-29 ENCOUNTER — PATIENT OUTREACH (OUTPATIENT)
Dept: PRIMARY CARE | Facility: CLINIC | Age: 54
End: 2024-03-29
Payer: COMMERCIAL

## 2024-03-29 LAB
ACT BLD: 209 SEC (ref 89–169)
ACT BLD: 237 SEC (ref 89–169)
ACT BLD: 248 SEC (ref 89–169)

## 2024-03-29 NOTE — PROGRESS NOTES
Discharge Facility: [facility] Diagnosis:    Heart Cath     Stent LAD      Admission Date: 3/27/2024   Discharge Date:  3/28/2024     PCP Appointment Date: Tasked to office   Specialist Appointment Date:  patient calling Dr. Josse vidal to schedule     Hospital Encounter and Summary: Linked    See discharge assessment below for further details     Engagement  Call Start Time: 1035 (3/29/2024 10:35 AM)    Medications  Medications reviewed with patient/caregiver?: Yes (3/29/2024 10:35 AM)  Is the patient having any side effects they believe may be caused by any medication additions or changes?: No (3/29/2024 10:35 AM)  Does the patient have all medications ordered at discharge?: -- (Patient states following DC med list) (3/29/2024 10:35 AM)  Prescription Comments: New Ticagrelor and Valsartan reviewed (3/29/2024 10:35 AM)  Is the patient taking all medications as directed (includes completed medication regime)?: -- (Patient states taking meds as directed) (3/29/2024 10:35 AM)  Care Management Interventions: Provided patient education (3/29/2024 10:35 AM)  Medication Comments: Patient has no questions at this time (3/29/2024 10:35 AM)    Appointments  Does the patient have a primary care provider?: Yes (3/29/2024 10:35 AM)  Care Management Interventions: Educated patient on importance of making appointment (Tasked office) (3/29/2024 10:35 AM)  Care Management Interventions: Advised to schedule with specialist (Patient calling Dr. Josse Vidal) (3/29/2024 10:35 AM)    Self Management  What is the home health agency?: NA (3/29/2024 10:35 AM)  What Durable Medical Equipment (DME) was ordered?: NA (3/29/2024 10:35 AM)    Patient Teaching  Does the patient have access to their discharge instructions?: Yes (3/29/2024 10:35 AM)  Care Management Interventions: Reviewed instructions with patient (3/29/2024 10:35 AM)  What is the patient's perception of their health status since discharge?: Improving (3/29/2024  10:35 AM)  Patient/Caregiver Education Comments: Patient aware to call providers for any questions concerns or change in condition (3/29/2024 10:35 AM)

## 2024-04-03 ENCOUNTER — DOCUMENTATION (OUTPATIENT)
Dept: CARDIAC REHAB | Facility: HOSPITAL | Age: 54
End: 2024-04-03
Payer: COMMERCIAL

## 2024-04-10 ENCOUNTER — PATIENT OUTREACH (OUTPATIENT)
Dept: PRIMARY CARE | Facility: CLINIC | Age: 54
End: 2024-04-10
Payer: COMMERCIAL

## 2024-04-10 NOTE — DISCHARGE SUMMARY
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            Hospital Course   Admitted for elective left heart catheterization.  She was noted to have severe disease involving the mid the left anterior descending artery and subsequently went on to have ad hoc angioplasty with deployment of a stent.  She was admitted to hospital overnight and has been discharged home.  To follow-up with me in the next 2 weeks         pertinent Physical Exam At Time of Discharge  Physical Exam  No new changes  Home Medications     Medication List      START taking these medications     Brilinta 90 mg tablet; Generic drug: ticagrelor; Take 1 tablet (90 mg)   by mouth 2 times a day.   valsartan 160 mg tablet; Commonly known as: Diovan; Take 1 tablet (160   mg) by mouth once daily. Do not start before March 29, 2024.     CONTINUE taking these medications     albuterol 90 mcg/actuation inhaler   amlodipine-valsartan 5-160 mg tablet; Commonly known as: Exforge; Take 1   tablet by mouth once daily.   ARIPiprazole 5 mg tablet; Commonly known as: Abilify   ascorbic acid 500 mg tablet; Commonly known as: Vitamin C   aspirin 81 mg EC tablet   clobetasol 0.05 % ointment; Commonly known as: Temovate   dexmethylphenidate XR 15 mg 24 hr capsule; Commonly known as: Focalin XR   escitalopram 20 mg tablet; Commonly known as: Lexapro   folic acid 1 mg tablet; Commonly known as: Folvite   hydroCHLOROthiazide 25 mg tablet; Commonly known as: HYDRODiuril   hydrocortisone 2.5 % cream   ibuprofen 200 mg tablet   LORazepam 0.5 mg tablet; Commonly known as: Ativan   medical cannabis   metFORMIN  mg 24 hr tablet; Commonly known as: Glucophage-XR   methotrexate 2.5 mg tablet; Commonly known as: Trexall   multivitamin tablet     ASK your doctor about these medications     atorvastatin 40 mg  tablet; Commonly known as: Lipitor; Take 1 tablet (40   mg) by mouth once daily.       Outpatient Follow-Up  No future appointments.    Josse Chou MD

## 2024-04-10 NOTE — PROGRESS NOTES
Call regarding F/U At time of outreach call the patient feels as if their condition has improved since last visit.    Reviewed the PCP appointment with the pt and addressed any questions or concerns.

## 2024-04-26 ENCOUNTER — PATIENT OUTREACH (OUTPATIENT)
Dept: PRIMARY CARE | Facility: CLINIC | Age: 54
End: 2024-04-26
Payer: COMMERCIAL

## 2024-04-26 NOTE — PROGRESS NOTES
Successful outreach to patient regarding hospitalization as patient continues TCM program.     At time of outreach call the patient feels as if their condition has improved  since initial visit with PCP or specialist.    Questions or concerns addressed at this time with patient.   Provided contact information to patient if any further non-emergent needs arise.        Patient encouraged to call providers for any questions concerns or change in condition.

## 2024-05-13 PROBLEM — R07.9 CHEST PAIN: Status: ACTIVE | Noted: 2024-03-25

## 2024-05-13 PROBLEM — E55.9 VITAMIN D DEFICIENCY: Status: ACTIVE | Noted: 2024-03-25

## 2024-05-13 PROBLEM — N63.20 MASS OF LEFT BREAST: Status: ACTIVE | Noted: 2024-05-13

## 2024-05-13 RX ORDER — CYCLOBENZAPRINE HCL 5 MG
TABLET ORAL EVERY 24 HOURS
COMMUNITY
Start: 2023-06-21

## 2024-05-13 RX ORDER — IBUPROFEN 100 MG/5ML
SUSPENSION, ORAL (FINAL DOSE FORM) ORAL EVERY 24 HOURS
COMMUNITY

## 2024-05-13 RX ORDER — VIT C/E/ZN/COPPR/LUTEIN/ZEAXAN 250MG-90MG
CAPSULE ORAL EVERY 24 HOURS
COMMUNITY

## 2024-05-13 RX ORDER — DESVENLAFAXINE 50 MG/1
TABLET, EXTENDED RELEASE ORAL EVERY 24 HOURS
COMMUNITY

## 2024-05-13 RX ORDER — MULTIVIT WITH IRON,MINERALS
TABLET,CHEWABLE ORAL
COMMUNITY

## 2024-05-13 RX ORDER — NITROGLYCERIN 0.4 MG/1
TABLET SUBLINGUAL
COMMUNITY
Start: 2024-03-25

## 2024-05-13 RX ORDER — VIT C/E/ZN/COPPR/LUTEIN/ZEAXAN 250MG-90MG
CAPSULE ORAL
COMMUNITY

## 2024-05-13 NOTE — PROGRESS NOTES
[facility]  Vidya Landry female   1970 53 y.o.   [id number]      Chief Complaint  New patient, left breast mass.    History Of Present Illness  Vidya Landry is a very pleasant 53 y.o.  female presenting to the breast center with a left breast mass seen on CT imaging. She is here for further evaluation. She denies breast surgery or biopsy. She has no family history of breast cancer.     BREAST IMAGIN2024 Bilateral diagnostic mammogram with left breast ultrasound, indicates BI-RADS Category 2.    REPRODUCTIVE HISTORY: menarche age 24, nulliparous, OCP's < 5 years, natural menopause age 50, no HRT, heterogeneously dense                                 FAMILY CANCER HISTORY:   Maternal Grandfather: Prostate cancer, age 70  Maternal Grandmother: Liver cancer, age 85     Review of Systems  Constitutional:  Negative for appetite change, fatigue, fever and unexpected weight change.   HENT:  Negative for ear pain, hearing loss, nosebleeds, sore throat and trouble swallowing.    Eyes:  Negative for discharge, itching and visual disturbance.   Breast: As stated in HPI.  Respiratory:  Negative for cough, chest tightness and shortness of breath.    Cardiovascular:  Negative for chest pain, palpitations and leg swelling.   Gastrointestinal:  Negative for abdominal pain, constipation, diarrhea and nausea.   Endocrine: Negative for cold intolerance and heat intolerance.   Genitourinary:  Negative for dysuria, frequency, hematuria, pelvic pain and vaginal bleeding.   Musculoskeletal:  Negative for arthralgias, back pain, gait problem, joint swelling and myalgias.   Skin:  Negative for color change and rash.   Allergic/Immunologic: Negative for environmental allergies and food allergies.   Neurological:  Negative for dizziness, tremors, speech difficulty, weakness, numbness and headaches.   Hematological:  Does not bruise/bleed easily.   Psychiatric/Behavioral:  Negative for agitation,  dysphoric mood and sleep disturbance. The patient is not nervous/anxious.      Past Medical History  She has no past medical history on file.    Surgical History  She has a past surgical history that includes Other surgical history (01/02/2020); Cardiac catheterization (N/A, 3/27/2024); and Cardiac catheterization (N/A, 3/27/2024).    Family History  Cancer-related family history includes Breast cancer in her cousin.     Social History  She reports that she quit smoking about 7 years ago. Her smoking use included cigarettes. She started smoking about 30 years ago. She has a 23 pack-year smoking history. She has never been exposed to tobacco smoke. She has never used smokeless tobacco. She reports current alcohol use. She reports current drug use. Drug: Marijuana.    Allergies  Amoxicillin-pot clavulanate, Balsam peru, Cobalt, and Dermatitis antigens    Medications  Current Outpatient Medications   Medication Instructions    albuterol 90 mcg/actuation inhaler 2 puffs, inhalation, Every 4 hours PRN    amlodipine-valsartan (Exforge) 5-160 mg tablet 1 tablet, oral, Daily    APPLE CIDER VINEGAR ORAL oral, Daily RT    ARIPiprazole (ABILIFY) 5 mg, oral, Daily    ascorbic acid (Vitamin C) 1,000 mg tablet oral, Every 24 hours    ascorbic acid (VITAMIN C) 500 mg, oral    aspirin 81 mg, oral, Daily    atorvastatin (LIPITOR) 40 mg, oral, Daily    cholecalciferol (Vitamin D-3) 25 MCG (1000 UT) capsule oral    cholecalciferol (Vitamin D3) 25 MCG (1000 UT) capsule oral, Every 24 hours    clobetasol (Temovate) 0.05 % ointment 1 Application, Topical, 2 times daily    cranberry fruit concentrate 500 mg capsule oral    cyclobenzaprine (Flexeril) 5 mg tablet oral, Every 24 hours    d-mannose 500 mg capsule oral    desvenlafaxine 50 mg 24 hr tablet oral, Every 24 hours    dexmethylphenidate XR (FOCALIN XR) 15 mg, oral, Daily    DOCOSAHEXAENOIC ACID-EPA ORAL oral    docosahexaenoic acid/epa (FISH OIL ORAL) oral, Every 24 hours     escitalopram (LEXAPRO) 20 mg, oral, Daily    folic acid (FOLVITE) 1 mg, oral, Daily    glucosamine-chondroitin 250-200 mg tablet oral    hydroCHLOROthiazide (HYDRODIURIL) 25 mg, oral, Daily    hydrocortisone 2.5 % cream Topical, 2 times daily    ibuprofen 200 mg, oral, Every 6 hours    L. acidophilus/Bifid. animalis 32 billion cell capsule oral    LORazepam (ATIVAN) 0.5 mg, oral    medical cannabis 1 each, oral, As needed    medical cannabis 1 each, oral    metFORMIN XR (GLUCOPHAGE-XR) 500 mg, oral, Daily with evening meal    methotrexate (TREXALL) 2.5 mg, oral, Once Weekly    methotrexate (Xatmep) 2.5 mg/mL oral solution oral    methylphenidate HCl (CONCERTA ORAL) oral    multivitamin (MULTIPLE VITAMINS ORAL) oral, Daily RT    multivitamin tablet 1 tablet, oral, Daily    nitroglycerin (Nitrostat) 0.4 mg SL tablet PLEASE SEE ATTACHED FOR DETAILED DIRECTIONS    phenazopyridine HCl (AZO-TABS ORAL) oral    valsartan (DIOVAN) 160 mg, oral, Daily    ZINC ORAL oral, Every 24 hours       Last Recorded Vitals  Vitals:    05/15/24 1037   BP: 118/76   Pulse: 66   Temp: 37.1 °C (98.8 °F)   SpO2: 98%        Physical Exam  Patient is alert and oriented x3 and in a relaxed and appropriate mood. Her gait is steady and hand grasps are equal. Sclera is clear. The breasts are nearly symmetrical. The tissue is dense throughout without palpable abnormalities, discrete nodules or masses. The skin and nipples appear normal. There is no cervical, supraclavicular or axillary lymphadenopathy. Heart rate and rhythm normal, S1 and S2 appreciated. The lungs are clear to auscultation bilaterally. Abdomen is soft and non-tender.     Relevant Results and Imaging  Study Result    Narrative & Impression   Interpreted By:  Dyana Quinn,   STUDY:  BI MAMMO BILATERAL DIAGNOSTIC TOMOSYNTHESIS; BI US BREAST LIMITED  LEFT;  5/15/2024 10:14 am; 5/15/2024 10:34 am      ACCESSION NUMBER(S):  [id number]; [id number]      ORDERING CLINICIAN:  LEROY  "JOSE DE JESUS      INDICATION:  Signs/Symptoms:Left breast mass seen on low-dose CT imaging, further  evaluation with diagnostic mammography is recommended;  Signs/Symptoms:call back.      COMPARISON:  Low-dose CT from nuclear medicine stress test 03/25/2024. Bilateral  mammograms 05/13/2023, 05/10/2022, 03/19/2021, 03/13/2020,  12/27/2018, 09/18/2013      FINDINGS:  MAMMOGRAPHY: 2D and tomosynthesis images were reviewed at 1 mm slice  thickness.      Density:  The breast tissue is heterogeneously dense, which may  obscure small masses.      There is a stable masslike focal asymmetry in the deep upper outer  left breast, mammographically stable dating back to 2013. This  correlates in size and position with the oval circumscribed mass  described on low-dose CT scan. The mammographic pattern is stable  bilaterally. No suspicious masses or calcifications are identified.      ULTRASOUND:  Sonographic scanning of the entire deep lateral and  central left breast confirms normal tissue. There is a hyperechoic  island of benign fibroglandular tissue at posterior depth in the  lateral left breast, correlating with the mammographic finding.      IMPRESSION:  No mammographic evidence of malignancy. Stable island of  fibroglandular tissue correlating with the \"mass\" described on  low-dose CT scan.      BI-RADS CATEGORY:      BI-RADS Category:  2 Benign.  Recommendation:  Annual Screening.  Recommended Date:  1 Year.  Laterality:  Bilateral.      For any future breast imaging appointments, please call [phone]).          MACRO:  None      Signed by: Dyana Quinn 5/15/2024 10:47 AM     I explained the results in depth, along with suggested explanation for follow up recommendations based on the testing results. BI-RADS Category 2      Visit Diagnosis  1. Mass of left breast, unspecified quadrant        2. Mass of upper outer quadrant of left breast  Referral to Breast Surgery        Assessment/Plan  Normal clinical exam and " imaging, no breast surgery or biopsy, no family history of breast cancer, heterogeneously dense    Plan:  Return to PCP for annual mammograms and exams.    Patient Discussion/Summary  Your clinical examination and imaging are normal. You no longer need to be seen by a breast specialist for an annual physical breast examination. It is important to continue annual screening mammograms and breast exams through your primary care provider. Please return to see me if you have a new breast problem or abnormal mammogram. It has been a pleasure having you as a patient.       You can see your health information, review clinical summaries from office visits & test results online when you follow your health with MY  Chart, a personal health record. To sign up go to [email or web address]. If you need assistance with signing up or trouble getting into your account call [facility] Patient Line 24/7 at [phone].    My office phone number is [phone] if you need to get in touch with me or have additional questions or concerns. Thank you for choosing [facility] and trusting me as your healthcare provider. I look forward to seeing you again at your next office visit. I am honored to be a provider on your health care team and I remain dedicated to helping you achieve your health goals.    Rae Tran, MATY-CNP

## 2024-05-15 ENCOUNTER — OFFICE VISIT (OUTPATIENT)
Dept: SURGICAL ONCOLOGY | Facility: CLINIC | Age: 54
End: 2024-05-15
Payer: COMMERCIAL

## 2024-05-15 ENCOUNTER — HOSPITAL ENCOUNTER (OUTPATIENT)
Dept: RADIOLOGY | Facility: CLINIC | Age: 54
Discharge: HOME | End: 2024-05-15
Payer: COMMERCIAL

## 2024-05-15 VITALS
DIASTOLIC BLOOD PRESSURE: 76 MMHG | HEART RATE: 66 BPM | SYSTOLIC BLOOD PRESSURE: 118 MMHG | WEIGHT: 210.8 LBS | TEMPERATURE: 98.8 F | BODY MASS INDEX: 34.02 KG/M2 | OXYGEN SATURATION: 98 %

## 2024-05-15 DIAGNOSIS — R93.89 ABNORMAL FINDING ON IMAGING: ICD-10-CM

## 2024-05-15 DIAGNOSIS — N63.21 MASS OF UPPER OUTER QUADRANT OF LEFT BREAST: ICD-10-CM

## 2024-05-15 DIAGNOSIS — N63.20 MASS OF LEFT BREAST, UNSPECIFIED QUADRANT: Primary | ICD-10-CM

## 2024-05-15 PROCEDURE — 3078F DIAST BP <80 MM HG: CPT | Performed by: NURSE PRACTITIONER

## 2024-05-15 PROCEDURE — 77062 BREAST TOMOSYNTHESIS BI: CPT

## 2024-05-15 PROCEDURE — 76642 ULTRASOUND BREAST LIMITED: CPT | Performed by: RADIOLOGY

## 2024-05-15 PROCEDURE — 1036F TOBACCO NON-USER: CPT | Performed by: NURSE PRACTITIONER

## 2024-05-15 PROCEDURE — 76981 USE PARENCHYMA: CPT | Mod: LT

## 2024-05-15 PROCEDURE — 99213 OFFICE O/P EST LOW 20 MIN: CPT | Performed by: NURSE PRACTITIONER

## 2024-05-15 PROCEDURE — 77066 DX MAMMO INCL CAD BI: CPT | Performed by: RADIOLOGY

## 2024-05-15 PROCEDURE — 99203 OFFICE O/P NEW LOW 30 MIN: CPT | Performed by: NURSE PRACTITIONER

## 2024-05-15 PROCEDURE — 3074F SYST BP LT 130 MM HG: CPT | Performed by: NURSE PRACTITIONER

## 2024-05-15 PROCEDURE — 76642 ULTRASOUND BREAST LIMITED: CPT | Mod: LT

## 2024-05-15 PROCEDURE — 77062 BREAST TOMOSYNTHESIS BI: CPT | Performed by: RADIOLOGY

## 2024-05-15 ASSESSMENT — PAIN SCALES - GENERAL: PAINLEVEL: 0-NO PAIN

## 2024-06-25 ENCOUNTER — PATIENT OUTREACH (OUTPATIENT)
Dept: PRIMARY CARE | Facility: CLINIC | Age: 54
End: 2024-06-25
Payer: COMMERCIAL

## 2024-06-25 NOTE — PROGRESS NOTES
Patient has met target of no readmission for (90) days post hospital discharge and is graduated from Transitional Care Management program at this time.       Encouraged to call providers for any questions concerns or change in condition

## 2024-12-16 ENCOUNTER — LAB (OUTPATIENT)
Dept: LAB | Facility: LAB | Age: 54
End: 2024-12-16
Payer: COMMERCIAL

## 2024-12-16 ENCOUNTER — OFFICE VISIT (OUTPATIENT)
Dept: PRIMARY CARE | Facility: CLINIC | Age: 54
End: 2024-12-16
Payer: COMMERCIAL

## 2024-12-16 VITALS
WEIGHT: 251.8 LBS | SYSTOLIC BLOOD PRESSURE: 108 MMHG | HEART RATE: 78 BPM | DIASTOLIC BLOOD PRESSURE: 72 MMHG | BODY MASS INDEX: 40.47 KG/M2 | OXYGEN SATURATION: 99 % | HEIGHT: 66 IN | TEMPERATURE: 97.3 F

## 2024-12-16 DIAGNOSIS — R73.03 PRE-DIABETES: ICD-10-CM

## 2024-12-16 DIAGNOSIS — L40.50 PSORIASIS WITH ARTHROPATHY (MULTI): ICD-10-CM

## 2024-12-16 DIAGNOSIS — I25.10 ATHEROSCLEROSIS OF CORONARY ARTERY OF NATIVE HEART WITHOUT ANGINA PECTORIS, UNSPECIFIED VESSEL OR LESION TYPE: Primary | ICD-10-CM

## 2024-12-16 DIAGNOSIS — I10 PRIMARY HYPERTENSION: ICD-10-CM

## 2024-12-16 DIAGNOSIS — E55.9 VITAMIN D DEFICIENCY: ICD-10-CM

## 2024-12-16 DIAGNOSIS — E78.5 DYSLIPIDEMIA (HIGH LDL; LOW HDL): ICD-10-CM

## 2024-12-16 DIAGNOSIS — R53.82 CHRONIC FATIGUE: ICD-10-CM

## 2024-12-16 DIAGNOSIS — E27.8 MASS OF LEFT ADRENAL GLAND (MULTI): ICD-10-CM

## 2024-12-16 DIAGNOSIS — I25.10 ATHEROSCLEROSIS OF CORONARY ARTERY OF NATIVE HEART WITHOUT ANGINA PECTORIS, UNSPECIFIED VESSEL OR LESION TYPE: ICD-10-CM

## 2024-12-16 PROBLEM — R21 RASH: Status: RESOLVED | Noted: 2024-03-26 | Resolved: 2024-12-16

## 2024-12-16 PROBLEM — I89.8 OTHER SPECIFIED NONINFECTIVE DISORDERS OF LYMPHATIC VESSELS AND LYMPH NODES: Status: RESOLVED | Noted: 2023-05-03 | Resolved: 2024-12-16

## 2024-12-16 PROBLEM — N92.6 IRREGULAR MENSTRUAL CYCLE: Status: RESOLVED | Noted: 2024-03-26 | Resolved: 2024-12-16

## 2024-12-16 PROBLEM — F10.10 ALCOHOL ABUSE: Status: RESOLVED | Noted: 2024-03-26 | Resolved: 2024-12-16

## 2024-12-16 PROBLEM — N63.20 MASS OF LEFT BREAST: Status: RESOLVED | Noted: 2024-05-13 | Resolved: 2024-12-16

## 2024-12-16 PROBLEM — R74.8 ABNORMAL LEVELS OF OTHER SERUM ENZYMES: Status: RESOLVED | Noted: 2023-10-07 | Resolved: 2024-12-16

## 2024-12-16 PROBLEM — R07.9 CHEST PAIN: Status: RESOLVED | Noted: 2024-03-25 | Resolved: 2024-12-16

## 2024-12-16 PROBLEM — R59.0 LOCALIZED ENLARGED LYMPH NODES: Status: RESOLVED | Noted: 2023-02-16 | Resolved: 2024-12-16

## 2024-12-16 PROBLEM — M62.830 MUSCLE SPASM OF BACK: Status: RESOLVED | Noted: 2024-03-26 | Resolved: 2024-12-16

## 2024-12-16 PROBLEM — M70.70 BURSITIS OF HIP: Status: RESOLVED | Noted: 2024-03-26 | Resolved: 2024-12-16

## 2024-12-16 PROBLEM — N94.3 PREMENSTRUAL TENSION SYNDROME: Status: RESOLVED | Noted: 2024-03-26 | Resolved: 2024-12-16

## 2024-12-16 PROBLEM — R73.01 IMPAIRED FASTING GLUCOSE: Status: RESOLVED | Noted: 2017-02-16 | Resolved: 2024-12-16

## 2024-12-16 PROBLEM — R94.39 ABNORMAL CARDIOVASCULAR STRESS TEST: Status: RESOLVED | Noted: 2024-03-25 | Resolved: 2024-12-16

## 2024-12-16 LAB
25(OH)D3 SERPL-MCNC: 71 NG/ML (ref 30–100)
ALBUMIN SERPL BCP-MCNC: 4.3 G/DL (ref 3.4–5)
ALP SERPL-CCNC: 91 U/L (ref 33–110)
ALT SERPL W P-5'-P-CCNC: 21 U/L (ref 7–45)
ANION GAP SERPL CALCULATED.3IONS-SCNC: 12 MMOL/L (ref 10–20)
AST SERPL W P-5'-P-CCNC: 21 U/L (ref 9–39)
BILIRUB SERPL-MCNC: 0.5 MG/DL (ref 0–1.2)
BUN SERPL-MCNC: 12 MG/DL (ref 6–23)
CALCIUM SERPL-MCNC: 9.6 MG/DL (ref 8.6–10.3)
CHLORIDE SERPL-SCNC: 103 MMOL/L (ref 98–107)
CHOLEST SERPL-MCNC: 164 MG/DL (ref 0–199)
CHOLEST/HDLC SERPL: 2.2 {RATIO}
CO2 SERPL-SCNC: 30 MMOL/L (ref 21–32)
CREAT SERPL-MCNC: 0.68 MG/DL (ref 0.5–1.05)
EGFRCR SERPLBLD CKD-EPI 2021: >90 ML/MIN/1.73M*2
ERYTHROCYTE [DISTWIDTH] IN BLOOD BY AUTOMATED COUNT: 13.3 % (ref 11.5–14.5)
EST. AVERAGE GLUCOSE BLD GHB EST-MCNC: 103 MG/DL
GLUCOSE SERPL-MCNC: 84 MG/DL (ref 74–99)
HBA1C MFR BLD: 5.2 %
HCT VFR BLD AUTO: 38.6 % (ref 36–46)
HDLC SERPL-MCNC: 75.4 MG/DL
HGB BLD-MCNC: 12.6 G/DL (ref 12–16)
LDLC SERPL CALC-MCNC: 66 MG/DL
MCH RBC QN AUTO: 30.4 PG (ref 26–34)
MCHC RBC AUTO-ENTMCNC: 32.6 G/DL (ref 32–36)
MCV RBC AUTO: 93 FL (ref 80–100)
NON HDL CHOLESTEROL: 89 MG/DL (ref 0–149)
NRBC BLD-RTO: 0 /100 WBCS (ref 0–0)
PLATELET # BLD AUTO: 276 X10*3/UL (ref 150–450)
POTASSIUM SERPL-SCNC: 4.2 MMOL/L (ref 3.5–5.3)
PROT SERPL-MCNC: 6.9 G/DL (ref 6.4–8.2)
RBC # BLD AUTO: 4.14 X10*6/UL (ref 4–5.2)
SODIUM SERPL-SCNC: 141 MMOL/L (ref 136–145)
TRIGL SERPL-MCNC: 114 MG/DL (ref 0–149)
TSH SERPL-ACNC: 1.67 MIU/L (ref 0.44–3.98)
VIT B12 SERPL-MCNC: 943 PG/ML (ref 211–911)
VLDL: 23 MG/DL (ref 0–40)
WBC # BLD AUTO: 7.6 X10*3/UL (ref 4.4–11.3)

## 2024-12-16 PROCEDURE — 36415 COLL VENOUS BLD VENIPUNCTURE: CPT

## 2024-12-16 PROCEDURE — 3078F DIAST BP <80 MM HG: CPT | Performed by: FAMILY MEDICINE

## 2024-12-16 PROCEDURE — 99214 OFFICE O/P EST MOD 30 MIN: CPT | Performed by: FAMILY MEDICINE

## 2024-12-16 PROCEDURE — 80053 COMPREHEN METABOLIC PANEL: CPT

## 2024-12-16 PROCEDURE — 85027 COMPLETE CBC AUTOMATED: CPT

## 2024-12-16 PROCEDURE — 84443 ASSAY THYROID STIM HORMONE: CPT

## 2024-12-16 PROCEDURE — 82607 VITAMIN B-12: CPT

## 2024-12-16 PROCEDURE — 3008F BODY MASS INDEX DOCD: CPT | Performed by: FAMILY MEDICINE

## 2024-12-16 PROCEDURE — 3074F SYST BP LT 130 MM HG: CPT | Performed by: FAMILY MEDICINE

## 2024-12-16 PROCEDURE — 80061 LIPID PANEL: CPT

## 2024-12-16 PROCEDURE — 82306 VITAMIN D 25 HYDROXY: CPT

## 2024-12-16 PROCEDURE — 83036 HEMOGLOBIN GLYCOSYLATED A1C: CPT

## 2024-12-16 RX ORDER — CLOPIDOGREL BISULFATE 75 MG/1
75 TABLET ORAL DAILY
COMMUNITY
End: 2024-12-16 | Stop reason: SDUPTHER

## 2024-12-16 RX ORDER — INFLUENZA A VIRUS A/GEORGIA/12/2022 CVR-167 (H1N1) ANTIGEN (MDCK CELL DERIVED, PROPIOLACTONE INACTIVATED), INFLUENZA A VIRUS A/SYDNEY/1304/2022 (H3N2) ANTIGEN (MDCK CELL DERIVED, PROPIOLACTONE INACTIVATED), INFLUENZA B VIRUS B/SINGAPORE/WUH4618/2021 ANTIGEN (MDCK CELL DERIVED, PROPIOLACTONE INACTIVATED) 15; 15; 15 UG/.5ML; UG/.5ML; UG/.5ML
INJECTION, SUSPENSION INTRAMUSCULAR
COMMUNITY
Start: 2024-08-31 | End: 2024-12-16 | Stop reason: WASHOUT

## 2024-12-16 RX ORDER — ATORVASTATIN CALCIUM 40 MG/1
40 TABLET, FILM COATED ORAL DAILY
Qty: 90 TABLET | Refills: 3 | Status: SHIPPED | OUTPATIENT
Start: 2024-12-16 | End: 2025-12-16

## 2024-12-16 RX ORDER — AMLODIPINE AND VALSARTAN 5; 160 MG/1; MG/1
1 TABLET ORAL DAILY
Qty: 90 TABLET | Refills: 3 | Status: SHIPPED | OUTPATIENT
Start: 2024-12-16 | End: 2025-12-16

## 2024-12-16 RX ORDER — CLOPIDOGREL BISULFATE 75 MG/1
75 TABLET ORAL DAILY
Qty: 90 TABLET | Refills: 3 | Status: SHIPPED | OUTPATIENT
Start: 2024-12-16 | End: 2025-12-16

## 2024-12-16 RX ORDER — PNEUMOCOCCAL 20-VALENT CONJUGATE VACCINE 2.2; 2.2; 2.2; 2.2; 2.2; 2.2; 2.2; 2.2; 2.2; 2.2; 2.2; 2.2; 2.2; 2.2; 2.2; 2.2; 4.4; 2.2; 2.2; 2.2 UG/.5ML; UG/.5ML; UG/.5ML; UG/.5ML; UG/.5ML; UG/.5ML; UG/.5ML; UG/.5ML; UG/.5ML; UG/.5ML; UG/.5ML; UG/.5ML; UG/.5ML; UG/.5ML; UG/.5ML; UG/.5ML; UG/.5ML; UG/.5ML; UG/.5ML; UG/.5ML
INJECTION, SUSPENSION INTRAMUSCULAR
COMMUNITY
Start: 2024-06-01 | End: 2024-12-16 | Stop reason: WASHOUT

## 2024-12-16 ASSESSMENT — LIFESTYLE VARIABLES
AUDIT-C TOTAL SCORE: 1
SKIP TO QUESTIONS 9-10: 1
HOW OFTEN DO YOU HAVE SIX OR MORE DRINKS ON ONE OCCASION: NEVER
HOW OFTEN DO YOU HAVE A DRINK CONTAINING ALCOHOL: MONTHLY OR LESS
HOW MANY STANDARD DRINKS CONTAINING ALCOHOL DO YOU HAVE ON A TYPICAL DAY: 1 OR 2

## 2024-12-16 ASSESSMENT — PATIENT HEALTH QUESTIONNAIRE - PHQ9
1. LITTLE INTEREST OR PLEASURE IN DOING THINGS: NOT AT ALL
SUM OF ALL RESPONSES TO PHQ9 QUESTIONS 1 AND 2: 0
2. FEELING DOWN, DEPRESSED OR HOPELESS: NOT AT ALL

## 2024-12-16 ASSESSMENT — PAIN SCALES - GENERAL: PAINLEVEL_OUTOF10: 0-NO PAIN

## 2024-12-16 NOTE — LETTER
December 16, 2024     Patient: Vidya Landry   YOB: 1970   Date of Visit: 12/16/2024       To Whom It May Concern:    Vidya Landry was seen in my clinic on 12/16/2024 at 9:30 am. Please excuse Vidya for her absence from work on this day to make the appointment.    If you have any questions or concerns, please don't hesitate to call.         Sincerely,         Cale Chi MD        CC: No Recipients

## 2024-12-16 NOTE — PROGRESS NOTES
History Of Present Illness  Vidya Landry is a 54 y.o. female presenting for Follow-up (Medication follow up. Patient wants to have A1c check with other lab orders.)  .    Has atherosclerotic coronary artery disease, status post left heart cath on 3/27/2024, under Dr Chou.  Needs to establish with new cardiologist.  She is tolerating the Plavix without any abnormal bleeding.  Tolerates statin without adverse side effects.  Essential hypertension is controlled with hydrochlorothiazide and valsartan, however patient complains of urinary frequency.    She complains of chronic fatigue.  No fever, chills or night sweats or abnormal weight loss.  However has gained significant amount of weight, 40 pounds in the last 7 months.  Concern for diabetes as she has had a history of prediabetes in the past.  However, last A1c 9 months ago was 5.2%             Past Medical History  Patient Active Problem List    Diagnosis Date Noted    Atherosclerosis of coronary artery 03/26/2024    Disorder of connective tissue (Multi) 03/26/2024    History of colonic polyps 03/26/2024    Long term methotrexate user 03/26/2024    Moderate recurrent major depression 03/26/2024    Psoriasis with arthropathy (Multi) 03/26/2024    Recurrent major depressive disorder, in remission ([facility]) 03/26/2024    Mixed anxiety depressive disorder 03/26/2024    Hypertrophy of bone, unspecified shoulder 03/26/2024    Lichen sclerosus et atrophicus 03/26/2024    Long term current use of immunosuppressive drug 03/26/2024    Vitamin D deficiency 03/25/2024    Dermatomyositis (Multi) 03/20/2024    MARK (generalized anxiety disorder) 03/20/2024    Mass of left adrenal gland (Multi) 03/20/2024    Lichen sclerosus 03/20/2024    Long term (current) use of antimetabolite agent 10/07/2023    Encounter for therapeutic drug level monitoring 10/07/2023    Psoriasis 03/04/2023    Intrinsic (allergic) eczema 03/04/2023    Other long term (current) drug therapy  03/04/2023    Gastroesophageal reflux disease without esophagitis 06/18/2018    Primary hypertension 06/18/2018    Daytime sleepiness 04/02/2018    Fatigue 04/02/2018    Pre-diabetes 04/02/2018    Presbyopia of both eyes 05/11/2016    Hyperopia of both eyes with astigmatism 05/11/2016    Obesity 11/19/2015    Depressive disorder 06/04/2013    Obstructive sleep apnea (adult) (pediatric) 05/23/2011    Parasomnia in conditions classified elsewhere 08/22/2008    Seborrheic dermatitis 08/05/2008    Hyperlipidemia 02/03/2006    Polycystic ovaries 11/08/2002    Allergic rhinitis 09/03/2002        Medications  Current Outpatient Medications   Medication Sig Dispense Refill    albuterol 90 mcg/actuation inhaler Inhale 2 puffs every 4 hours if needed for wheezing or shortness of breath.      APPLE CIDER VINEGAR ORAL Take by mouth once daily.      ARIPiprazole (Abilify) 5 mg tablet Take 1 tablet (5 mg) by mouth once daily.      ascorbic acid (Vitamin C) 1,000 mg tablet Take by mouth once every 24 hours.      aspirin 81 mg EC tablet Take 1 tablet (81 mg) by mouth once daily.      cholecalciferol (Vitamin D-3) 25 MCG (1000 UT) capsule Take by mouth.      clobetasol (Temovate) 0.05 % ointment Apply 1 Application topically 2 times a day.      cranberry fruit concentrate 500 mg capsule Take by mouth.      d-mannose 500 mg capsule Take by mouth.      desvenlafaxine 50 mg 24 hr tablet Take by mouth once every 24 hours.      dexmethylphenidate XR (Focalin XR) 15 mg 24 hr capsule Take 1 capsule (15 mg) by mouth once daily.      docosahexaenoic acid/epa (FISH OIL ORAL) Take by mouth once every 24 hours.      escitalopram (Lexapro) 20 mg tablet Take 1 tablet (20 mg) by mouth once daily.      folic acid (Folvite) 1 mg tablet Take 1 tablet (1 mg) by mouth once daily.      glucosamine-chondroitin 250-200 mg tablet Take by mouth.      hydrocortisone 2.5 % cream Apply topically 2 times a day.      ibuprofen 200 mg tablet Take 1 tablet (200  mg) by mouth every 6 hours.      L. acidophilus/Bifid. animalis 32 billion cell capsule Take by mouth.      LORazepam (Ativan) 0.5 mg tablet Take 1 tablet (0.5 mg) by mouth.      medical cannabis Take by mouth.      metFORMIN  mg 24 hr tablet Take 1 tablet (500 mg) by mouth once daily in the evening. Take with meals.      methotrexate (Trexall) 2.5 mg tablet Take 7 mg by mouth 1 (one) time per week.      multivitamin (MULTIPLE VITAMINS ORAL) Take by mouth once daily.      nitroglycerin (Nitrostat) 0.4 mg SL tablet PLEASE SEE ATTACHED FOR DETAILED DIRECTIONS      ZINC ORAL Take by mouth once every 24 hours.      amlodipine-valsartan (Exforge) 5-160 mg tablet Take 1 tablet by mouth once daily. 90 tablet 3    atorvastatin (Lipitor) 40 mg tablet Take 1 tablet (40 mg) by mouth once daily. 90 tablet 3    clopidogrel (Plavix) 75 mg tablet Take 1 tablet (75 mg) by mouth once daily. 90 tablet 3     No current facility-administered medications for this visit.        Surgical History  She has a past surgical history that includes Other surgical history (01/02/2020); Cardiac catheterization (N/A, 3/27/2024); and Cardiac catheterization (N/A, 3/27/2024).     Social History  She reports that she quit smoking about 7 years ago. Her smoking use included cigarettes. She started smoking about 30 years ago. She has a 23 pack-year smoking history. She has never been exposed to tobacco smoke. She has never used smokeless tobacco. She reports current alcohol use. She reports current drug use. Drug: Marijuana.    Family History  Family History   Problem Relation Name Age of Onset    Hypertension Mother      Diabetes Mother      Psoriasis Mother      Diabetes Father      Kidney failure Father      Breast cancer Cousin          Allergies  Amoxicillin-pot clavulanate, Balsam peru, Cobalt, and Dermatitis antigens    Immunizations  Immunization History   Administered Date(s) Administered    Flu vaccine (IIV4), preservative free *Check  "age/dose* 09/28/2016, 09/20/2017, 09/12/2018, 09/05/2020, 09/11/2021    Flu vaccine, quadrivalent, no egg protein, age 6 month or greater (FLUCELVAX) 09/14/2019, 09/22/2023    Flu vaccine, quadrivalent, recombinant, preservative free, adult (FLUBLOK) 08/26/2022    Flu vaccine, trivalent, preservative free, age 6 months and greater (Fluarix/Fluzone/Flulaval) 10/01/2015    Hepatitis A vaccine, age 19 years and greater (HAVRIX) 06/15/2016, 12/16/2016    Hepatitis B vaccine, adult *Check Product/Dose* 06/15/2016, 07/13/2016, 10/12/2016    Influenza, Unspecified 11/08/2002, 10/21/2003, 10/26/2011    Influenza, injectable, quadrivalent 09/12/2018, 10/01/2019, 10/12/2020, 09/11/2021    Influenza, seasonal, injectable 11/16/2000, 11/09/2001, 10/01/2015, 10/13/2016, 10/23/2018, 10/09/2019, 10/13/2023    Meningococcal ACWY vaccine (MENVEO) 07/13/2016    Novel influenza-H1N1-09, preservative-free 12/05/2009    Pfizer COVID-19 vaccine, 12 years and older, (30mcg/0.3mL) (Comirnaty) 09/22/2023    Pfizer COVID-19 vaccine, bivalent, age 12 years and older (30 mcg/0.3 mL) 09/16/2022, 04/22/2023    Pfizer Purple Cap SARS-CoV-2 03/26/2021, 04/23/2021, 08/14/2021, 02/12/2022    Pneumococcal polysaccharide vaccine, 23-valent, age 2 years and older (PNEUMOVAX 23) 09/14/2019    Tdap vaccine, age 7 year and older (BOOSTRIX, ADACEL) 03/01/2010, 06/02/2020    Typhoid, ViCPs 12/16/2016    Zoster vaccine, recombinant, adult (SHINGRIX) 07/31/2021, 10/02/2021        ROS  Negative, except as discussed in HPI     Vitals  /72   Pulse 78   Temp 36.3 °C (97.3 °F)   Ht 1.676 m (5' 6\")   Wt 114 kg (251 lb 12.8 oz)   SpO2 99%   BMI 40.64 kg/m²      Physical Exam  Vitals and nursing note reviewed.   Constitutional:       General: She is not in acute distress.     Appearance: Normal appearance.   Cardiovascular:      Rate and Rhythm: Normal rate and regular rhythm.      Heart sounds: Normal heart sounds.   Pulmonary:      Effort: No " respiratory distress.      Breath sounds: Normal breath sounds.   Neurological:      General: No focal deficit present.      Mental Status: She is alert. Mental status is at baseline.         Relevant Results  Lab Results   Component Value Date    WBC 7.6 12/16/2024    WBC 6.4 03/28/2024    HGB 12.6 12/16/2024    HGB 11.5 (L) 03/28/2024    HCT 38.6 12/16/2024    HCT 34.4 (L) 03/28/2024    MCV 93 12/16/2024    MCV 93 03/28/2024     12/16/2024     03/28/2024     Lab Results   Component Value Date     12/16/2024     03/28/2024    K 4.2 12/16/2024    K 3.9 03/28/2024     12/16/2024     03/28/2024    CO2 30 12/16/2024    CO2 24 03/28/2024    BUN 12 12/16/2024    BUN 13 03/28/2024    CREATININE 0.68 12/16/2024    CREATININE 0.60 03/28/2024    CALCIUM 9.6 12/16/2024    CALCIUM 9.2 03/28/2024    PROT 6.9 12/16/2024    PROT 6.8 03/25/2024    BILITOT 0.5 12/16/2024    BILITOT 0.3 03/25/2024    ALKPHOS 91 12/16/2024    ALKPHOS 87 03/25/2024    ALT 21 12/16/2024    ALT 20 03/25/2024    AST 21 12/16/2024    AST 19 03/25/2024    GLUCOSE 84 12/16/2024    GLUCOSE 90 03/28/2024     Lab Results   Component Value Date    HGBA1C 5.2 12/16/2024     Lab Results   Component Value Date    TSH 1.67 12/16/2024    FREET4 1.5 01/03/2022      Lab Results   Component Value Date    CHOL 164 12/16/2024    TRIG 114 12/16/2024    HDL 75.4 12/16/2024           Assessment/Plan   Vidya was seen today for follow-up.  Diagnoses and all orders for this visit:  Atherosclerosis of coronary artery of native heart without angina pectoris, unspecified vessel or lesion type (Primary)  -     Referral to Cardiology; Future  -     clopidogrel (Plavix) 75 mg tablet; Take 1 tablet (75 mg) by mouth once daily.  -     Comprehensive Metabolic Panel; Future  -     Lipid Panel; Future  Dyslipidemia (high LDL; low HDL)  -     atorvastatin (Lipitor) 40 mg tablet; Take 1 tablet (40 mg) by mouth once daily.  Primary  hypertension  Comments:  At goal but will discontinue hydrochlorothiazide due to urinary frequency  Orders:  -     amlodipine-valsartan (Exforge) 5-160 mg tablet; Take 1 tablet by mouth once daily.  Pre-diabetes  -     Hemoglobin A1C; Future  Mass of left adrenal gland (Multi)  -     Referral to Endocrinology; Future  Psoriasis with arthropathy (Multi)  Comments:  on MTX  Vitamin D deficiency  -     Vitamin D 25-Hydroxy,Total (for eval of Vitamin D levels); Future  Chronic fatigue  -     TSH with reflex to Free T4 if abnormal; Future  -     CBC; Future  -     Vitamin B12; Future         Counseling:   Medication education:   -Education:  The patient is counseled regarding potential side-effects of any and all new medications  -Understanding:  Patient expressed understanding of information discussed today  -Adherence:  No barriers to adherence identified    Final treatment plan is a result of shared decision making with patient.         Cale Chi MD

## (undated) DEVICE — CATHETER, DIAGNOSTIC, 6 FR-145 ANGLE INFINITI PIG

## (undated) DEVICE — CATHETER, GUIDE, RUNWAY, JL4, 6FR X 100CM, NO SIDE HOLES, LT

## (undated) DEVICE — GUIDEWIRE, J TIP, 3 MM, 0.035 IN X 150 CM, PTFE

## (undated) DEVICE — SHEATH, PINNACLE, W/.038 GUIDEWIRE, 10 CM,  6FR INTRODUCER, 6FR DIA, 2.5 CM DIALATOR

## (undated) DEVICE — CATHETER, BALLOON DILATION, EUPHORA SEMICOMPLIANT 3.00  X 20 MM X 142CM

## (undated) DEVICE — CLOSURE DEVICE, VASCULAR, ANGIO-SEAL, VIP, 6FR, LF

## (undated) DEVICE — INFLATION KIT, ADVANTAGE, ENCORE 26 (1/BOX)

## (undated) DEVICE — GUIDEWIRE, J -TIP, CHOICE FLOPPY, .014 X 182CM

## (undated) DEVICE — PACK, ANGIO P2, CUSTOM, LAKE

## (undated) DEVICE — KIT, NAMIC STANDARD LEFT HEART, CUSTOM, LWMC

## (undated) DEVICE — CATHETER, DIAGNOSTIC, INFINITI, 6 FR, JL 4.0

## (undated) DEVICE — COVER, EQUIPMENT, ZERO GRAVITY

## (undated) DEVICE — CATHETER, EXPO, MODEL-D, 6FR FR4